# Patient Record
Sex: FEMALE | Race: BLACK OR AFRICAN AMERICAN | NOT HISPANIC OR LATINO | Employment: FULL TIME | URBAN - METROPOLITAN AREA
[De-identification: names, ages, dates, MRNs, and addresses within clinical notes are randomized per-mention and may not be internally consistent; named-entity substitution may affect disease eponyms.]

---

## 2024-04-02 LAB
EXTERNAL ABO GROUPING: NORMAL
EXTERNAL ANTIBODY SCREEN: NORMAL
EXTERNAL HEPATITIS B SURFACE ANTIGEN: NEGATIVE
EXTERNAL RH FACTOR: POSITIVE
HCV AB S/CO SERPL IA: NONREACTIVE
HEMOGLOBIN FRACTIONATION: NORMAL
HIV 1+2 AB+HIV1 P24 AG SERPL QL IA: NONREACTIVE
RUBV IGG SERPL IA-ACNC: NORMAL
T PALLIDUM IGG SER QL: NONREACTIVE

## 2024-05-22 ENCOUNTER — TELEPHONE (OUTPATIENT)
Dept: OBSTETRICS AND GYNECOLOGY | Age: 19
End: 2024-05-22

## 2024-05-22 NOTE — TELEPHONE ENCOUNTER
Caller: Maria L Carlisle    Relationship: Self    Best call back number:[phone]      Who are you requesting to speak with (clinical staff, NO KNOWN PROVIDER      What was the call regarding: PATIENT CALLED TO SCHEDULE AN APPT.  PATIENT ALSO ADVISED THAT SHE CAME INTO THE OFFICE AND SIGNED THE MEDICAL RECORDS RELEASE FORM, TO PREVIOUS MEDICAL RECORDS FROM DR. SHEN IN [place], [place]. PATIENT WOULD LIKE TO KNOW WHEN SHE CAN SCHEDULE APPT.  PLEASE UPDATE

## 2024-06-05 ENCOUNTER — INITIAL PRENATAL (OUTPATIENT)
Dept: OBSTETRICS AND GYNECOLOGY | Age: 19
End: 2024-06-05
Payer: MEDICAID

## 2024-06-05 VITALS — SYSTOLIC BLOOD PRESSURE: 120 MMHG | WEIGHT: 154 LBS | DIASTOLIC BLOOD PRESSURE: 82 MMHG

## 2024-06-05 DIAGNOSIS — Z3A.18 18 WEEKS GESTATION OF PREGNANCY: Primary | ICD-10-CM

## 2024-06-05 LAB
BILIRUB BLD-MCNC: NEGATIVE MG/DL
CLARITY, POC: CLEAR
COLOR UR: YELLOW
GLUCOSE UR STRIP-MCNC: NEGATIVE MG/DL
KETONES UR QL: NEGATIVE
LEUKOCYTE EST, POC: NEGATIVE
NITRITE UR-MCNC: NEGATIVE MG/ML
PH UR: 8.5 [PH] (ref 5–8)
PROT UR STRIP-MCNC: NEGATIVE MG/DL
RBC # UR STRIP: NEGATIVE /UL
SP GR UR: 1.01 (ref 1–1.03)
UROBILINOGEN UR QL: ABNORMAL

## 2024-06-05 RX ORDER — GUAIFENESIN 600 MG/1
1200 TABLET, EXTENDED RELEASE ORAL
COMMUNITY
Start: 2024-06-01 | End: 2024-06-09

## 2024-06-05 RX ORDER — PNV,CALCIUM 72/IRON/FOLIC ACID 27 MG-1 MG
1 TABLET ORAL DAILY
COMMUNITY
Start: 2024-03-05

## 2024-06-05 RX ORDER — DEXTROMETHORPHAN POLISTIREX 30 MG/5ML
60 SUSPENSION ORAL
COMMUNITY
Start: 2024-06-01

## 2024-06-05 RX ORDER — FLUTICASONE PROPIONATE AND SALMETEROL XINAFOATE 230; 21 UG/1; UG/1
2 AEROSOL, METERED RESPIRATORY (INHALATION)
COMMUNITY
Start: 2024-02-05

## 2024-06-05 NOTE — PROGRESS NOTES
[facility]   Obstetrics and Gynecology   New Obstetric Visit    2024    Patient: Maria L Carlisle          MR#:[id number]    History of Present Illness    Chief Complaint   Patient presents with    Initial Prenatal Visit     New OB, Transfer of care, Anatomy U/S today, Pt is 18w2d, Pt has no complaints today       18 y.o. female  at 18w2d presents for NOB visit.  She is doing well today.  Taking prenatal vitamins.    Studies reviewed: Reviewed records from [facility]     ________________________________________  Patient Active Problem List   Diagnosis    18 weeks gestation of pregnancy     OB History          1    Para        Term                AB        Living             SAB        IAB        Ectopic        Molar        Multiple        Live Births                      Social History:  Denies h/o gonorrhea, chlamydia, herpes, syphilis, HIV  Denies family history of birth defects and genetic disorders    No past medical history on file.  No past surgical history on file.  Social History     Tobacco Use   Smoking Status Not on file   Smokeless Tobacco Not on file     No family history on file.  Prior to Admission medications    Medication Sig Start Date End Date Taking? Authorizing Provider   dextromethorphan polistirex ER (DELSYM) 30 MG/5ML Suspension Extended Release oral suspension Take 10 mL by mouth. 24  Yes Provider, MD Samia   fluticasone-salmeterol (Advair HFA) 230-21 MCG/ACT inhaler Inhale 2 puffs. 24  Yes Provider, MD Samia   guaiFENesin (MUCINEX) 600 MG 12 hr tablet Take 2 tablets by mouth. 24 Yes Provider, MD Samia   Prenatal Vit-Fe Fumarate-FA (WesTab Plus) 27-1 MG tablet Take 1 tablet by mouth Daily. 3/5/24  Yes ProviderSamia MD     ________________________________________    The following portions of the patient's history were reviewed and updated as appropriate: allergies, current medications, past family history, past medical  history, past social history, past surgical history, and problem list.    Review of Systems   All other systems reviewed and are negative.           Objective     /82   Wt 69.9 kg (154 lb)   LMP 2024    BP Readings from Last 3 Encounters:   24 120/82      Wt Readings from Last 3 Encounters:   24 69.9 kg (154 lb)        BMI: There is no height or weight on file to calculate BMI.    Physical Exam  Vitals and nursing note reviewed.   Constitutional:       Appearance: Normal appearance.   HENT:      Head: Normocephalic and atraumatic.   Pulmonary:      Effort: Pulmonary effort is normal.   Neurological:      Mental Status: She is alert and oriented to person, place, and time.   Psychiatric:         Mood and Affect: Mood normal.           Assessment:  18 y.o. female  at 18w2d presents for NOB visit.  Diagnoses and all orders for this visit:    1. 18 weeks gestation of pregnancy (Primary)  Overview:  -PNL: plan for GBS at 36wga  -Pap: not yet indicated  -Genetics: cfDNA/msAFP/carrier > discuss with patient about whether or not she had this performed at [place], anatomy Us normal but incomplete on 24  -Imm: RI, VI, tdap after 28 weeks   -Contraception: _  -Birthplan: _  -Care coordination: accepts blood transfusions, no latex allergy, call schedule reviewed         Orders:  -     Urine Culture - Urine, Urine, Clean Catch  -     Chlamydia trachomatis, Neisseria gonorrhoeae, Trichomonas vaginalis, PCR - Urine, Urine, Clean Catch  -     POC Urinalysis Dipstick          Plan:  Return in about 4 weeks (around 7/3/2024).      Nan Wheat MD  2024 11:20 EDT

## 2024-06-07 LAB
BACTERIA UR CULT: NORMAL
BACTERIA UR CULT: NORMAL
C TRACH RRNA SPEC QL NAA+PROBE: NEGATIVE
N GONORRHOEA RRNA SPEC QL NAA+PROBE: NEGATIVE
T VAGINALIS RRNA SPEC QL NAA+PROBE: NEGATIVE

## 2024-06-12 ENCOUNTER — REFERRAL TRIAGE (OUTPATIENT)
Dept: LABOR AND DELIVERY | Facility: HOSPITAL | Age: 19
End: 2024-06-12
Payer: MEDICAID

## 2024-06-19 ENCOUNTER — PATIENT OUTREACH (OUTPATIENT)
Dept: LABOR AND DELIVERY | Facility: HOSPITAL | Age: 19
End: 2024-06-19
Payer: COMMERCIAL

## 2024-06-19 NOTE — OUTREACH NOTE
[facility]  Enrollment    Current Estimated Gestational Age: 20w2d    Questions/Answers      Flowsheet Row Responses   Would like to participate? Yes   Date of Intake Visit 06/19/24            [facility]  Intake    Current Estimated Gestational Age: 20w2d    Intake Assessment      Flowsheet Row Responses   Best Method for Contacting Cell   Currently Employed Yes  []   Able to keep appointments as scheduled Yes   Gender(s) and Name(s) boy   Do you have a dentist? Yes   Dentist Name in [place] but needs to find one here   Resources Presently Utilizing: None   Maternal Warning Signs Provided   Other Education HANDS, [place] Benefits, How to find a pediatrician, Insurance benefits/Incentives            Learning Assessment      Flowsheet Row Responses   Relationship Patient   Learner Name Maria L   Does the learner have any barriers to learning? No Barriers   What is the preferred language of the learner for medical teaching? English   Is an  required? No   How does the learner prefer to learn new concepts? Listening, Reading, Demonstration, Pictures/Video          Intake completed today. She works full time at a  and lives with her grandmother. She is working on getting a car and in the meantime her family is able to help with transportation. Reviewed WIC and sent her the number. She has already started the process of ordering a breast pump and has a baby shower coming up in a few months. F/u in two months.     Isabell Meyer RN  Maternity Nurse Navigator    6/19/2024, 13:24 EDT

## 2024-06-27 ENCOUNTER — HOSPITAL ENCOUNTER (EMERGENCY)
Facility: HOSPITAL | Age: 19
Discharge: HOME | End: 2024-06-27
Attending: STUDENT IN AN ORGANIZED HEALTH CARE EDUCATION/TRAINING PROGRAM | Admitting: OBSTETRICS & GYNECOLOGY
Payer: COMMERCIAL

## 2024-06-27 VITALS
HEART RATE: 85 BPM | BODY MASS INDEX: 30.21 KG/M2 | WEIGHT: 160 LBS | SYSTOLIC BLOOD PRESSURE: 116 MMHG | RESPIRATION RATE: 18 BRPM | HEIGHT: 61 IN | DIASTOLIC BLOOD PRESSURE: 62 MMHG | TEMPERATURE: 98.3 F

## 2024-06-27 PROCEDURE — 99284 EMERGENCY DEPT VISIT MOD MDM: CPT | Performed by: OBSTETRICS & GYNECOLOGY
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
## 2024-06-27 NOTE — NURSING NOTE
Pt given discharge teachings and when to return to hospital. Pt verbalized understanding. Pt ambulated off unit with belongings.

## 2024-07-03 ENCOUNTER — LAB (OUTPATIENT)
Facility: HOSPITAL | Age: 19
End: 2024-07-03
Payer: COMMERCIAL

## 2024-07-03 ENCOUNTER — ROUTINE PRENATAL (OUTPATIENT)
Dept: OBSTETRICS AND GYNECOLOGY | Age: 19
End: 2024-07-03
Payer: COMMERCIAL

## 2024-07-03 VITALS — WEIGHT: 156 LBS | DIASTOLIC BLOOD PRESSURE: 68 MMHG | BODY MASS INDEX: 29.48 KG/M2 | SYSTOLIC BLOOD PRESSURE: 112 MMHG

## 2024-07-03 DIAGNOSIS — O28.3 ECHOGENIC INTRACARDIAC FOCUS OF FETUS ON PRENATAL ULTRASOUND: ICD-10-CM

## 2024-07-03 DIAGNOSIS — Z3A.22 22 WEEKS GESTATION OF PREGNANCY: Primary | ICD-10-CM

## 2024-07-03 LAB
CLARITY, POC: CLEAR
COLOR UR: YELLOW
GLUCOSE UR STRIP-MCNC: NEGATIVE MG/DL
PROT UR STRIP-MCNC: NEGATIVE MG/DL

## 2024-07-03 RX ORDER — BUDESONIDE AND FORMOTEROL FUMARATE DIHYDRATE 160; 4.5 UG/1; UG/1
2 AEROSOL RESPIRATORY (INHALATION)
COMMUNITY
Start: 2024-06-13

## 2024-07-03 NOTE — PROGRESS NOTES
Maria L Carlisle, a 18 y.o.  at 22w2d, presents for OB follow-up.  She is doing well today.  Denies loss of fluid, vaginal bleeding, and contractions.  Endorses fetal movements.    Objective  BP Readings from Last 3 Encounters:   24 112/68   24 116/62   24 120/82      Wt Readings from Last 3 Encounters:   24 70.8 kg (156 lb)   24 72.6 kg (160 lb)   24 69.9 kg (154 lb)      Total weight gain this pregnancy: 4.536 kg (10 lb)    General: Awake, alert, no apparent distress  Respiratory: No increased work of breathing  Abdomen: Fundus soft and nontender  Extremity: Nontender, no edema    A/P  Diagnoses and all orders for this visit:    1. 22 weeks gestation of pregnancy (Primary)  Overview:  -PNL: ION labs reviewed, O+, no GC/CT or urine culture in scanned labs, will collect 24, plan for GBS at 36wga  -Pap: not yet indicated  -Genetics: cfDNA/msAFP/carrier > discuss with patient about whether or not she had this performed at [place], anatomy Us normal but incomplete on 24  -Imm: RI, VI, tdap after 28 weeks   -Contraception: _  -Birthplan: _  -Care coordination: accepts blood transfusions, no latex allergy, call schedule reviewed         Orders:  -     POC Urinalysis Dipstick  -     [facility] Panorama Prenatal Test: Chromosomes 13, 18, 21, X & Y: Triploidy 22Q.11.2 Deletion - Blood,    2. Echogenic intracardiac focus of fetus on prenatal ultrasound  Overview:  - Has not yet had genetic testing.   - We discussed that as an isolated finding with normal genetic testing this can be a normal variant, but can also be a marker of aneuploidy.         EFW 11%tile today. Will repeat growth with next visit.   1 hour next visit.   Labor precautions reviewed  Return in about 4 weeks (around 2024).    Nan Wheat MD

## 2024-08-01 ENCOUNTER — ROUTINE PRENATAL (OUTPATIENT)
Dept: OBSTETRICS AND GYNECOLOGY | Age: 19
End: 2024-08-01
Payer: COMMERCIAL

## 2024-08-01 VITALS — SYSTOLIC BLOOD PRESSURE: 126 MMHG | DIASTOLIC BLOOD PRESSURE: 80 MMHG | WEIGHT: 164 LBS | BODY MASS INDEX: 30.99 KG/M2

## 2024-08-01 DIAGNOSIS — O28.3 ECHOGENIC INTRACARDIAC FOCUS OF FETUS ON PRENATAL ULTRASOUND: ICD-10-CM

## 2024-08-01 DIAGNOSIS — Z23 NEED FOR TDAP VACCINATION: ICD-10-CM

## 2024-08-01 DIAGNOSIS — Z3A.26 26 WEEKS GESTATION OF PREGNANCY: Primary | ICD-10-CM

## 2024-08-01 NOTE — PROGRESS NOTES
Maria L Carlisle, a 18 y.o.  at 26w3d, presents for OB follow-up.  She is doing well today.  Denies loss of fluid, vaginal bleeding, and contractions.  Endorses fetal movements.    Objective  BP Readings from Last 3 Encounters:   24 126/80   24 112/68   24 116/62      Wt Readings from Last 3 Encounters:   24 74.4 kg (164 lb)   24 70.8 kg (156 lb)   24 72.6 kg (160 lb)      Total weight gain this pregnancy: 8.165 kg (18 lb)    General: Awake, alert, no apparent distress  Respiratory: No increased work of breathing  Abdomen: Fundus soft and nontender  Extremity: Nontender, no edema    A/P  Diagnoses and all orders for this visit:    1. 26 weeks gestation of pregnancy (Primary)  Overview:  -PNL: ION labs reviewed, O+, no GC/CT or urine culture in scanned labs, will collect 24, plan for GBS at 36wga  -Pap: not yet indicated  -Genetics: cfDNA/msAFP/carrier > low risk, anatomy Us normal-Imm: RI, VI, tdap after 28 weeks   -Contraception: _  -Birthplan: _  -Care coordination: accepts blood transfusions, no latex allergy, call schedule reviewed         Orders:  -     POC Urinalysis Dipstick  -     Gestational Screen 1 Hr ([facility])  -     CBC & Differential  -     RPR, Rfx Qn RPR / Confirm TP    2. Echogenic intracardiac focus of fetus on prenatal ultrasound  Overview:  - Has not yet had genetic testing.   - We discussed that as an isolated finding with normal genetic testing this can be a normal variant, but can also be a marker of aneuploidy.           Labor precautions reviewed  No follow-ups on file.    Nan Wheat MD

## 2024-08-02 LAB
BASOPHILS # BLD AUTO: 0 X10E3/UL (ref 0–0.2)
BASOPHILS NFR BLD AUTO: 0 %
EOSINOPHIL # BLD AUTO: 0.6 X10E3/UL (ref 0–0.4)
EOSINOPHIL NFR BLD AUTO: 5 %
ERYTHROCYTE [DISTWIDTH] IN BLOOD BY AUTOMATED COUNT: 12.8 % (ref 11.7–15.4)
GLUCOSE 1H P 50 G GLC PO SERPL-MCNC: 89 MG/DL (ref 70–139)
HCT VFR BLD AUTO: 36.6 % (ref 34–46.6)
HGB BLD-MCNC: 11.7 G/DL (ref 11.1–15.9)
IMM GRANULOCYTES # BLD AUTO: 0.1 X10E3/UL (ref 0–0.1)
IMM GRANULOCYTES NFR BLD AUTO: 1 %
LYMPHOCYTES # BLD AUTO: 2 X10E3/UL (ref 0.7–3.1)
LYMPHOCYTES NFR BLD AUTO: 15 %
MCH RBC QN AUTO: 29.8 PG (ref 26.6–33)
MCHC RBC AUTO-ENTMCNC: 32 G/DL (ref 31.5–35.7)
MCV RBC AUTO: 93 FL (ref 79–97)
MONOCYTES # BLD AUTO: 0.5 X10E3/UL (ref 0.1–0.9)
MONOCYTES NFR BLD AUTO: 4 %
NEUTROPHILS # BLD AUTO: 9.7 X10E3/UL (ref 1.4–7)
NEUTROPHILS NFR BLD AUTO: 75 %
PLATELET # BLD AUTO: 246 X10E3/UL (ref 150–450)
RBC # BLD AUTO: 3.92 X10E6/UL (ref 3.77–5.28)
RPR SER QL: NON REACTIVE
WBC # BLD AUTO: 12.8 X10E3/UL (ref 3.4–10.8)

## 2024-08-12 ENCOUNTER — PATIENT OUTREACH (OUTPATIENT)
Dept: LABOR AND DELIVERY | Facility: HOSPITAL | Age: 19
End: 2024-08-12
Payer: COMMERCIAL

## 2024-08-12 NOTE — OUTREACH NOTE
[facility]  Check-In    Current Estimated Gestational Age: 28w0d      Questions/Answers      Flowsheet Row Responses   Best Method for Contacting Cell   Demographics Reviewed Yes   Currently Employed Yes   Able to keep appointments as scheduled Yes   Gender(s) and Name(s) boy   Baby Active/Feeling Fetal Movemen Yes   How are you presently feeling? good   Supplies ready for baby Breast Pump, Clothing, Diapers   Resource/Environmental Concerns None   Do you have any questions related to your care experience, your pregnancy, plans for delivery, any concerns, etc? No   Other Education Insurance benefits/Incentives, [facility] Benefits            Pt doing well. She does report some light intermittent cramping and states that she has discussed this with her OB. She has had issues reaching [facility] for an appt but will keep trying. Still planning a baby shower for Sept and will call [facility] about bonus benefits. She reports active fetal movement. F/u around 35 weeks.     Isabell Meyer RN  Maternity Nurse Navigator    8/12/2024, 12:37 EDT

## 2024-08-14 ENCOUNTER — HOSPITAL ENCOUNTER (EMERGENCY)
Facility: HOSPITAL | Age: 19
Discharge: HOME | End: 2024-08-14
Attending: STUDENT IN AN ORGANIZED HEALTH CARE EDUCATION/TRAINING PROGRAM | Admitting: OBSTETRICS & GYNECOLOGY
Payer: COMMERCIAL

## 2024-08-14 ENCOUNTER — TELEPHONE (OUTPATIENT)
Dept: OBSTETRICS AND GYNECOLOGY | Age: 19
End: 2024-08-14
Payer: COMMERCIAL

## 2024-08-14 VITALS
HEART RATE: 86 BPM | RESPIRATION RATE: 18 BRPM | WEIGHT: 165.6 LBS | DIASTOLIC BLOOD PRESSURE: 71 MMHG | TEMPERATURE: 98.5 F | SYSTOLIC BLOOD PRESSURE: 130 MMHG | HEIGHT: 61 IN | BODY MASS INDEX: 31.26 KG/M2

## 2024-08-14 LAB
BACTERIA UR QL AUTO: ABNORMAL /HPF
BILIRUB UR QL STRIP: NEGATIVE
CLARITY UR: CLEAR
COLOR UR: YELLOW
GLUCOSE UR STRIP-MCNC: NEGATIVE MG/DL
HGB UR QL STRIP.AUTO: NEGATIVE
HYALINE CASTS UR QL AUTO: ABNORMAL /LPF
KETONES UR QL STRIP: NEGATIVE
LEUKOCYTE ESTERASE UR QL STRIP.AUTO: ABNORMAL
NITRITE UR QL STRIP: NEGATIVE
PH UR STRIP.AUTO: 7 [PH] (ref 5–8)
PROT UR QL STRIP: NEGATIVE
RBC # UR STRIP: ABNORMAL /HPF
REF LAB TEST METHOD: ABNORMAL
SP GR UR STRIP: 1.01 (ref 1–1.03)
SQUAMOUS #/AREA URNS HPF: ABNORMAL /HPF
UROBILINOGEN UR QL STRIP: ABNORMAL
WBC # UR STRIP: ABNORMAL /HPF

## 2024-08-14 PROCEDURE — 59025 FETAL NON-STRESS TEST: CPT

## 2024-08-14 PROCEDURE — 87086 URINE CULTURE/COLONY COUNT: CPT | Performed by: OBSTETRICS & GYNECOLOGY

## 2024-08-14 PROCEDURE — 81001 URINALYSIS AUTO W/SCOPE: CPT | Performed by: OBSTETRICS & GYNECOLOGY

## 2024-08-14 PROCEDURE — 99284 EMERGENCY DEPT VISIT MOD MDM: CPT | Performed by: OBSTETRICS & GYNECOLOGY

## 2024-08-14 RX ORDER — ALBUTEROL SULFATE 90 UG/1
4 AEROSOL, METERED RESPIRATORY (INHALATION)
COMMUNITY
Start: 2024-06-09

## 2024-08-14 RX ORDER — INHALER, ASSIST DEVICES
SPACER (EA) MISCELLANEOUS SEE ADMIN INSTRUCTIONS
COMMUNITY
Start: 2024-06-09

## 2024-08-15 ENCOUNTER — ROUTINE PRENATAL (OUTPATIENT)
Dept: OBSTETRICS AND GYNECOLOGY | Age: 19
End: 2024-08-15
Payer: COMMERCIAL

## 2024-08-15 VITALS — BODY MASS INDEX: 30.8 KG/M2 | WEIGHT: 163 LBS | SYSTOLIC BLOOD PRESSURE: 110 MMHG | DIASTOLIC BLOOD PRESSURE: 74 MMHG

## 2024-08-15 DIAGNOSIS — Z3A.28 28 WEEKS GESTATION OF PREGNANCY: ICD-10-CM

## 2024-08-15 DIAGNOSIS — O28.3 ECHOGENIC INTRACARDIAC FOCUS OF FETUS ON PRENATAL ULTRASOUND: Primary | ICD-10-CM

## 2024-08-15 NOTE — TELEPHONE ENCOUNTER
Called pt back after she paged MD on call. She complains of decreased fetal movement today. Advised pt to proceed to L&D for evaluation.

## 2024-08-15 NOTE — OBED NOTES
HERI Note OB        Patient Name: Maria L Carlisle  YOB: 2005  MRN: [id number]  Admission Date: 2024  9:27 PM  Date of Service: 2024    Chief Complaint: Decreased Fetal Movement (To HERI with c/o only feeling baby move 1x/today)        Subjective     Maria L Carlisle is a 18 y.o. female  at 28w2d with Estimated Date of Delivery: 24 who presents with the chief complaint listed above.  She sees Nan Wheat, * for her prenatal care. Her pregnancy has been complicated by:  echogenic intracardiac focus.    She describes fetal movement as decreased.  She denies rupture of membranes.  She denies vaginal bleeding. She is not feeling contractions.          Objective   Patient Active Problem List    Diagnosis     Echogenic intracardiac focus of fetus on prenatal ultrasound [O28.3]     26 weeks gestation of pregnancy [Z3A.26]         OB History    Para Term  AB Living   1 0 0 0 0 0   SAB IAB Ectopic Molar Multiple Live Births   0 0 0 0 0 0      # Outcome Date GA Lbr Ethan/2nd Weight Sex Type Anes PTL Lv   1 Current                 Past Medical History:   Diagnosis Date    Asthma        History reviewed. No pertinent surgical history.    No current facility-administered medications on file prior to encounter.     Current Outpatient Medications on File Prior to Encounter   Medication Sig Dispense Refill    albuterol sulfate  (90 Base) MCG/ACT inhaler Inhale 4 puffs.      Spacer/Aero-Holding Chambers (ProChamber VHC) device Inhale See Admin Instructions. with inhaler      budesonide-formoterol (SYMBICORT) 160-4.5 MCG/ACT inhaler Inhale 2 puffs.      dextromethorphan polistirex ER (DELSYM) 30 MG/5ML Suspension Extended Release oral suspension Take 10 mL by mouth. (Patient not taking: Reported on 7/3/2024)      fluticasone-salmeterol (Advair HFA) 230-21 MCG/ACT inhaler Inhale 2 puffs.      Prenatal Vit-Fe Fumarate-FA (WesTab Plus) 27-1 MG tablet Take 1 tablet by mouth  "Daily.         Allergies   Allergen Reactions    Pumpkin Seed Oil Unknown - Low Severity     All pumpkin    Peanut-Containing Drug Products Rash       Family History   Problem Relation Age of Onset    Other (peripartum cardiomyopathy) Mother        Social History     Socioeconomic History    Marital status: Single   Tobacco Use    Smoking status: Never    Smokeless tobacco: Never   Substance and Sexual Activity    Alcohol use: Never    Drug use: Never           Review of Systems   Constitutional: Negative.    HENT: Negative.     Respiratory: Negative.     Cardiovascular: Negative.    Gastrointestinal: Negative.    Genitourinary: Negative.    Neurological: Negative.           PHYSICAL EXAM:      VITAL SIGNS:  Vitals:    08/14/24 2100 08/14/24 2142   BP:  130/71   BP Location:  Right arm   Patient Position:  Sitting   Pulse:  86   Resp:  18   Temp:  98.5 °F (36.9 °C)   TempSrc:  Oral   Weight: 75.1 kg (165 lb 9.6 oz)    Height:  154.9 cm (61\")        FHT:                   Baseline:  150 BPM  Variability:  Moderate = 6 - 25 BPM  Accelerations:  10 x 10 accelerations present     Decelerations: few shallow variables initially  Contractions:   absent     Interpretation:    Reactive NST for gesational age, CAT 1 tracing        PHYSICAL EXAM:    General: well developed; well nourished  no acute distress   Heart: Not performed.   Lungs  : breathing is unlabored     Abdomen: soft, non-tender; no masses       Cervix: was not checked.      Contractions: none        Extremities: peripheral pulses normal, no pedal edema, no clubbing or cyanosis      LABS AND TESTING ORDERED:  Uterine and fetal monitoring  Urinalysis      LAB RESULTS:    Recent Results (from the past 24 hour(s))   Urinalysis With Culture If Indicated - Urine, Clean Catch    Collection Time: 08/14/24  9:46 PM    Specimen: Urine, Clean Catch   Result Value Ref Range    Color, UA Yellow Yellow, Straw    Appearance, UA Clear Clear    pH, UA 7.0 5.0 - 8.0    Specific " "[place], UA 1.012 1.005 - 1.030    Glucose, UA Negative Negative    Ketones, UA Negative Negative    Bilirubin, UA Negative Negative    Blood, UA Negative Negative    Protein, UA Negative Negative    Leuk Esterase, UA Large (3+) (A) Negative    Nitrite, UA Negative Negative    Urobilinogen, UA 0.2 E.U./dL 0.2 - 1.0 E.U./dL   Urinalysis, Microscopic Only - Urine, Clean Catch    Collection Time: 24  9:46 PM    Specimen: Urine, Clean Catch   Result Value Ref Range    RBC, UA 0-2 None Seen, 0-2 /HPF    WBC, UA 6-10 (A) None Seen, 0-2 /HPF    Bacteria, UA None Seen None Seen /HPF    Squamous Epithelial Cells, UA 3-6 (A) None Seen, 0-2 /HPF    Hyaline Casts, UA None Seen None Seen /LPF    Methodology Automated Microscopy        Lab Results   Component Value Date    ABO O 2024    RH Positive 2024       No results found for: \"STREPGPB\"              Assessment & Plan     ASSESSMENT/PLAN:  Maria L Carlisle is a 18 y.o. female  at 28w2d who presented with: decreased fetal movement      PLAN:     Patient began to feel fetal movement while in HERI  She was discharged home with labor precautions.  Patient will keep her scheduled outpatient prenatal appointment.    I have spent 30 minutes including face to face time with the patient, greater than 50% in discussion of the diagnosis (counseling) and/or coordination of care.     Katie Colby MD  2024  22:32 EDT  OB Hospitalist  Phone:  [phone]  "

## 2024-08-15 NOTE — PROGRESS NOTES
Maria L Carlisle, a 18 y.o.  at 28w3d, presents for OB follow-up.  She is doing well today.  Denies loss of fluid, vaginal bleeding, and contractions.  Endorses fetal movements.    Objective  BP Readings from Last 3 Encounters:   08/15/24 110/74   24 130/71   24 126/80      Wt Readings from Last 3 Encounters:   08/15/24 73.9 kg (163 lb)   24 75.1 kg (165 lb 9.6 oz)   24 74.4 kg (164 lb)      Total weight gain this pregnancy: 7.711 kg (17 lb)    General: Awake, alert, no apparent distress  Respiratory: No increased work of breathing  Abdomen: Fundus soft and nontender  Extremity: Nontender, no edema    A/P  Diagnoses and all orders for this visit:    1. Echogenic intracardiac focus of fetus on prenatal ultrasound (Primary)  Overview:  - Has not yet had genetic testing.   - We discussed that as an isolated finding with normal genetic testing this can be a normal variant, but can also be a marker of aneuploidy.       2. 28 weeks gestation of pregnancy  Overview:  -PNL: ION labs reviewed, O+, no GC/CT or urine culture in scanned labs, will collect 24, plan for GBS at 36wga  -Pap: not yet indicated  -Genetics: cfDNA/msAFP/carrier > low risk, anatomy Us normal-Imm: RI, VI, tdap after 28 weeks   -Contraception: _  -Birthplan: _  -Care coordination: accepts blood transfusions, no latex allergy, call schedule reviewed         Orders:  -     POC Urinalysis Dipstick        Labor precautions reviewed  Return in about 2 weeks (around 2024).    Nan Wheat MD

## 2024-08-16 LAB — BACTERIA SPEC AEROBE CULT: NO GROWTH

## 2024-08-29 ENCOUNTER — ROUTINE PRENATAL (OUTPATIENT)
Dept: OBSTETRICS AND GYNECOLOGY | Age: 19
End: 2024-08-29
Payer: COMMERCIAL

## 2024-08-29 VITALS — WEIGHT: 167 LBS | DIASTOLIC BLOOD PRESSURE: 72 MMHG | SYSTOLIC BLOOD PRESSURE: 120 MMHG | BODY MASS INDEX: 31.55 KG/M2

## 2024-08-29 DIAGNOSIS — Z3A.30 30 WEEKS GESTATION OF PREGNANCY: ICD-10-CM

## 2024-08-29 DIAGNOSIS — O99.019 MATERNAL ANEMIA IN PREGNANCY, ANTEPARTUM: ICD-10-CM

## 2024-08-29 DIAGNOSIS — O28.3 ECHOGENIC INTRACARDIAC FOCUS OF FETUS ON PRENATAL ULTRASOUND: ICD-10-CM

## 2024-08-29 DIAGNOSIS — Z34.93 PRENATAL CARE IN THIRD TRIMESTER: Primary | ICD-10-CM

## 2024-08-29 LAB
GLUCOSE UR STRIP-MCNC: NEGATIVE MG/DL
PROT UR STRIP-MCNC: NEGATIVE MG/DL

## 2024-08-29 RX ORDER — FERROUS SULFATE 325(65) MG
325 TABLET ORAL
Qty: 30 TABLET | Refills: 4 | Status: SHIPPED | OUTPATIENT
Start: 2024-08-29

## 2024-08-29 NOTE — PROGRESS NOTES
Chief Complaint   Patient presents with    Routine Prenatal Visit     30 weeks  CC:  Pt was in hospital on 2024, dx pneumonia       HPI: 18 y.o.  at 30w3d     Doing well  Was in hospital with pneumonia last week  She is doing much better  Reports good FM  Denies LOF, bleeding or ctx's  Pt of Dr Wheat    Vitals:    24 1134   BP: 120/72   Weight: 75.8 kg (167 lb)       ROS:  GI:  Negative  : Negative  Pulmonary: Negative     A/P  1. Intrauterine pregnancy at 30w3d   2. Pregnancy Risk:  NORMAL    Diagnoses and all orders for this visit:    1. Prenatal care in third trimester (Primary)    2. 30 weeks gestation of pregnancy  -     POC Urinalysis Dipstick    3. Echogenic intracardiac focus of fetus on prenatal ultrasound    4. Maternal anemia in pregnancy, antepartum    Other orders  -     ferrous sulfate 325 (65 FE) MG tablet; Take 1 tablet by mouth Daily With Breakfast.  Dispense: 30 tablet; Refill: 4        -----------------------  PLAN:   Anemia - start iron daily  PTL/FKC discussed  Return for Next scheduled follow up.      Ivon Caicedo, APRN  2024 11:49 EDT

## 2024-09-12 ENCOUNTER — ROUTINE PRENATAL (OUTPATIENT)
Dept: OBSTETRICS AND GYNECOLOGY | Age: 19
End: 2024-09-12
Payer: COMMERCIAL

## 2024-09-12 ENCOUNTER — HOSPITAL ENCOUNTER (OUTPATIENT)
Dept: CARDIOLOGY | Facility: HOSPITAL | Age: 19
Discharge: HOME | End: 2024-09-12
Admitting: STUDENT IN AN ORGANIZED HEALTH CARE EDUCATION/TRAINING PROGRAM
Payer: COMMERCIAL

## 2024-09-12 VITALS — WEIGHT: 170 LBS | DIASTOLIC BLOOD PRESSURE: 78 MMHG | BODY MASS INDEX: 32.12 KG/M2 | SYSTOLIC BLOOD PRESSURE: 120 MMHG

## 2024-09-12 DIAGNOSIS — O99.019 MATERNAL ANEMIA IN PREGNANCY, ANTEPARTUM: ICD-10-CM

## 2024-09-12 DIAGNOSIS — Z3A.32 32 WEEKS GESTATION OF PREGNANCY: Primary | ICD-10-CM

## 2024-09-12 DIAGNOSIS — R60.0 LEG EDEMA, RIGHT: ICD-10-CM

## 2024-09-12 LAB
BH CV LOWER VASCULAR LEFT COMMON FEMORAL AUGMENT: NORMAL
BH CV LOWER VASCULAR LEFT COMMON FEMORAL COMPETENT: NORMAL
BH CV LOWER VASCULAR LEFT COMMON FEMORAL COMPRESS: NORMAL
BH CV LOWER VASCULAR LEFT COMMON FEMORAL PHASIC: NORMAL
BH CV LOWER VASCULAR LEFT COMMON FEMORAL SPONT: NORMAL
BH CV LOWER VASCULAR LEFT DISTAL FEMORAL COMPRESS: NORMAL
BH CV LOWER VASCULAR LEFT GASTRONEMIUS COMPRESS: NORMAL
BH CV LOWER VASCULAR LEFT GREATER SAPH AK COMPRESS: NORMAL
BH CV LOWER VASCULAR LEFT GREATER SAPH BK COMPRESS: NORMAL
BH CV LOWER VASCULAR LEFT LESSER SAPH COMPRESS: NORMAL
BH CV LOWER VASCULAR LEFT MID FEMORAL AUGMENT: NORMAL
BH CV LOWER VASCULAR LEFT MID FEMORAL COMPETENT: NORMAL
BH CV LOWER VASCULAR LEFT MID FEMORAL COMPRESS: NORMAL
BH CV LOWER VASCULAR LEFT MID FEMORAL PHASIC: NORMAL
BH CV LOWER VASCULAR LEFT MID FEMORAL SPONT: NORMAL
BH CV LOWER VASCULAR LEFT PERONEAL COMPRESS: NORMAL
BH CV LOWER VASCULAR LEFT POPLITEAL AUGMENT: NORMAL
BH CV LOWER VASCULAR LEFT POPLITEAL COMPETENT: NORMAL
BH CV LOWER VASCULAR LEFT POPLITEAL COMPRESS: NORMAL
BH CV LOWER VASCULAR LEFT POPLITEAL PHASIC: NORMAL
BH CV LOWER VASCULAR LEFT POPLITEAL SPONT: NORMAL
BH CV LOWER VASCULAR LEFT POSTERIOR TIBIAL COMPRESS: NORMAL
BH CV LOWER VASCULAR LEFT PROFUNDA FEMORAL COMPRESS: NORMAL
BH CV LOWER VASCULAR LEFT PROXIMAL FEMORAL COMPRESS: NORMAL
BH CV LOWER VASCULAR LEFT SAPHENOFEMORAL JUNCTION COMPRESS: NORMAL
BH CV LOWER VASCULAR RIGHT COMMON FEMORAL AUGMENT: NORMAL
BH CV LOWER VASCULAR RIGHT COMMON FEMORAL COMPETENT: NORMAL
BH CV LOWER VASCULAR RIGHT COMMON FEMORAL COMPRESS: NORMAL
BH CV LOWER VASCULAR RIGHT COMMON FEMORAL PHASIC: NORMAL
BH CV LOWER VASCULAR RIGHT COMMON FEMORAL SPONT: NORMAL

## 2024-09-12 PROCEDURE — 93971 EXTREMITY STUDY: CPT

## 2024-09-12 NOTE — PROGRESS NOTES
Maria L Carlisle, a 18 y.o.  at 32w3d, presents for OB follow-up.  She is doing well today.  Denies loss of fluid, vaginal bleeding, and contractions.  Endorses fetal movements.    Objective  BP Readings from Last 3 Encounters:   24 120/78   24 120/72   08/15/24 110/74      Wt Readings from Last 3 Encounters:   24 77.1 kg (170 lb)   24 75.8 kg (167 lb)   08/15/24 73.9 kg (163 lb)      Total weight gain this pregnancy: 10.9 kg (24 lb)    General: Awake, alert, no apparent distress  Respiratory: No increased work of breathing  Abdomen: Fundus soft and nontender  Extremity: Nontender, no edema    A/P  Diagnoses and all orders for this visit:    1. 32 weeks gestation of pregnancy (Primary)  Overview:  -PNL: ION labs reviewed, O+, no GC/CT or urine culture in scanned labs, will collect 24, plan for GBS at 36wga  -Pap: not yet indicated  -Genetics: cfDNA/msAFP/carrier > low risk, anatomy Us normal-Imm: RI, VI, tdap after 28 weeks   -Contraception: _  -Birthplan: _  -Care coordination: accepts blood transfusions, no latex allergy, call schedule reviewed         Orders:  -     POC Urinalysis Dipstick    2. Maternal anemia in pregnancy, antepartum    3. Leg edema, right  -     Duplex Venous Lower Extremity - Bilateral CAR      EIF > f/u US with next visit.   Lower extremity edema r>L, patient to go to hospital for LE dopplers.   Labor precautions reviewed  Return in about 2 weeks (around 2024).    Nan Wheat MD

## 2024-09-26 ENCOUNTER — ROUTINE PRENATAL (OUTPATIENT)
Dept: OBSTETRICS AND GYNECOLOGY | Age: 19
End: 2024-09-26
Payer: COMMERCIAL

## 2024-09-26 ENCOUNTER — PATIENT OUTREACH (OUTPATIENT)
Dept: LABOR AND DELIVERY | Facility: HOSPITAL | Age: 19
End: 2024-09-26
Payer: COMMERCIAL

## 2024-09-26 VITALS — WEIGHT: 169 LBS | SYSTOLIC BLOOD PRESSURE: 130 MMHG | DIASTOLIC BLOOD PRESSURE: 78 MMHG | BODY MASS INDEX: 31.93 KG/M2

## 2024-09-26 DIAGNOSIS — O21.9 NAUSEA AND VOMITING IN PREGNANCY: ICD-10-CM

## 2024-09-26 DIAGNOSIS — O99.019 MATERNAL ANEMIA IN PREGNANCY, ANTEPARTUM: ICD-10-CM

## 2024-09-26 DIAGNOSIS — J45.41 MODERATE PERSISTENT ASTHMA WITH EXACERBATION: ICD-10-CM

## 2024-09-26 DIAGNOSIS — O28.3 ECHOGENIC INTRACARDIAC FOCUS OF FETUS ON PRENATAL ULTRASOUND: ICD-10-CM

## 2024-09-26 DIAGNOSIS — Z3A.34 34 WEEKS GESTATION OF PREGNANCY: Primary | ICD-10-CM

## 2024-09-26 LAB
CLARITY, POC: CLEAR
COLOR UR: YELLOW
GLUCOSE UR STRIP-MCNC: NEGATIVE MG/DL
PROT UR STRIP-MCNC: ABNORMAL MG/DL

## 2024-09-26 RX ORDER — ONDANSETRON 4 MG/1
4 TABLET, ORALLY DISINTEGRATING ORAL EVERY 8 HOURS PRN
Qty: 30 TABLET | Refills: 0 | Status: SHIPPED | OUTPATIENT
Start: 2024-09-26

## 2024-09-26 RX ORDER — BUDESONIDE 0.5 MG/2ML
0.5 INHALANT ORAL
COMMUNITY
Start: 2024-09-19 | End: 2025-09-19

## 2024-09-26 RX ORDER — PREDNISONE 10 MG/1
TABLET ORAL
COMMUNITY
Start: 2024-09-16

## 2024-10-01 ENCOUNTER — TRANSCRIBE ORDERS (OUTPATIENT)
Dept: ULTRASOUND IMAGING | Facility: HOSPITAL | Age: 19
End: 2024-10-01
Payer: COMMERCIAL

## 2024-10-01 DIAGNOSIS — J45.41 MODERATE PERSISTENT ASTHMA WITH EXACERBATION: Primary | ICD-10-CM

## 2024-10-03 ENCOUNTER — OFFICE VISIT (OUTPATIENT)
Dept: OBSTETRICS AND GYNECOLOGY | Facility: CLINIC | Age: 19
End: 2024-10-03
Payer: COMMERCIAL

## 2024-10-03 ENCOUNTER — LAB (OUTPATIENT)
Dept: LAB | Facility: HOSPITAL | Age: 19
End: 2024-10-03
Payer: COMMERCIAL

## 2024-10-03 ENCOUNTER — TRANSCRIBE ORDERS (OUTPATIENT)
Dept: ULTRASOUND IMAGING | Facility: HOSPITAL | Age: 19
End: 2024-10-03
Payer: COMMERCIAL

## 2024-10-03 ENCOUNTER — HOSPITAL ENCOUNTER (OUTPATIENT)
Dept: ULTRASOUND IMAGING | Facility: HOSPITAL | Age: 19
Discharge: HOME | End: 2024-10-03
Payer: COMMERCIAL

## 2024-10-03 VITALS
WEIGHT: 170.8 LBS | BODY MASS INDEX: 32.27 KG/M2 | OXYGEN SATURATION: 96 % | HEART RATE: 92 BPM | SYSTOLIC BLOOD PRESSURE: 128 MMHG | TEMPERATURE: 98 F | DIASTOLIC BLOOD PRESSURE: 77 MMHG

## 2024-10-03 DIAGNOSIS — J45.41 MODERATE PERSISTENT ASTHMA WITH EXACERBATION: ICD-10-CM

## 2024-10-03 DIAGNOSIS — J45.909 ASTHMA COMPLICATING PREGNANCY, ANTEPARTUM: Primary | ICD-10-CM

## 2024-10-03 DIAGNOSIS — O99.519 ASTHMA COMPLICATING PREGNANCY, ANTEPARTUM: Primary | ICD-10-CM

## 2024-10-03 DIAGNOSIS — O36.5990 FETAL GROWTH RESTRICTION ANTEPARTUM: ICD-10-CM

## 2024-10-03 DIAGNOSIS — J45.41 MODERATE PERSISTENT ASTHMA WITH EXACERBATION: Primary | ICD-10-CM

## 2024-10-03 PROCEDURE — 36415 COLL VENOUS BLD VENIPUNCTURE: CPT | Performed by: NURSE PRACTITIONER

## 2024-10-03 PROCEDURE — 76820 UMBILICAL ARTERY ECHO: CPT

## 2024-10-03 PROCEDURE — 86777 TOXOPLASMA ANTIBODY: CPT | Performed by: NURSE PRACTITIONER

## 2024-10-03 PROCEDURE — 86644 CMV ANTIBODY: CPT | Performed by: NURSE PRACTITIONER

## 2024-10-03 PROCEDURE — 86778 TOXOPLASMA ANTIBODY IGM: CPT | Performed by: NURSE PRACTITIONER

## 2024-10-03 PROCEDURE — 76811 OB US DETAILED SNGL FETUS: CPT

## 2024-10-03 PROCEDURE — 76819 FETAL BIOPHYS PROFIL W/O NST: CPT

## 2024-10-03 PROCEDURE — 86645 CMV ANTIBODY IGM: CPT | Performed by: NURSE PRACTITIONER

## 2024-10-03 PROCEDURE — 86747 PARVOVIRUS ANTIBODY: CPT | Performed by: NURSE PRACTITIONER

## 2024-10-03 NOTE — PROGRESS NOTES
Pt reports that she is doing well and denies vaginal bleeding or LOF at this time. Notes occasional abdominal tightness and cramping, denies consistency. Reports active fetal movement. Reviewed when to call OB office or present to L&D for evaluation with symptoms such as decreased fetal movement, vaginal bleeding, LOF or ctxs. Pt verbalized understanding. Denies HA, visual changes or epigastric pain. Denies any additional complaints at time of appointment. Next OB appointment scheduled for 10/10. Follows with [facility]. Pulse ox placed with readings of 95-98% noted today. Patient reports she has a pulse ox at home for further evaluation.     Vitals:    10/03/24 0908   BP: 128/77   Pulse: 92   Temp: 98 °F (36.7 °C)   SpO2: 96%

## 2024-10-03 NOTE — PROGRESS NOTES
MATERNAL FETAL MEDICINE CONSULT NOTE    Dear Dr Nan Wheat, *:    Thank you for your kind referral of Maria L Carlisle.  As you know, she is a 18 y.o.   35w3d gestation (Estimated Date of Delivery: 24). This is a consult.     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 Reports normal fetal movement.   She denies headache, vision changes, shortness of breath, acute changes in edema, and RUQ pain.     Review of History:  Past Medical History:   Diagnosis Date    Asthma      History reviewed. No pertinent surgical history.    OB History    Para Term  AB Living   1 0 0 0 0 0   SAB IAB Ectopic Molar Multiple Live Births   0 0 0 0 0 0      # Outcome Date GA Lbr Ethan/2nd Weight Sex Type Anes PTL Lv   1 Current              Social History     Socioeconomic History    Marital status: Single   Tobacco Use    Smoking status: Never     Passive exposure: Never    Smokeless tobacco: Never   Vaping Use    Vaping status: Never Used   Substance and Sexual Activity    Alcohol use: Never    Drug use: Never     Family History   Problem Relation Age of Onset    Other (peripartum cardiomyopathy) Mother       Allergies   Allergen Reactions    Pumpkin Seed Oil Unknown - Low Severity     All pumpkin    Peanut-Containing Drug Products Rash      Current Outpatient Medications on File Prior to Visit   Medication Sig Dispense Refill    budesonide (PULMICORT) 0.5 MG/2ML nebulizer solution Inhale 2 mL.      budesonide-formoterol (SYMBICORT) 160-4.5 MCG/ACT inhaler Inhale 2 puffs.      ferrous sulfate 325 (65 FE) MG tablet Take 1 tablet by mouth Daily With Breakfast. 30 tablet 4    ondansetron ODT (ZOFRAN-ODT) 4 MG disintegrating tablet Take 1 tablet by mouth Every 8 (Eight) Hours  "As Needed for Nausea or Vomiting. 30 tablet 0    Prenatal Vit-Fe Fumarate-FA (WesTab Plus) 27-1 MG tablet Take 1 tablet by mouth Daily.      Spacer/Aero-Holding Chambers (ProChamber VHC) device Inhale See Admin Instructions. with inhaler      [DISCONTINUED] predniSONE (DELTASONE) 10 MG tablet 4 tabs po daily for 4 days then 3 tabs po daily for 4 days then 2 tabs po daily for 4 days then 1 tab po daily for 4 days then stop. (Patient not taking: Reported on 10/3/2024)       No current facility-administered medications on file prior to visit.        Past obstetric, gynecological, medical, surgical, family and social history reviewed.  Relevant lab work and imaging reviewed.    Review of systems  Constitutional:  denies fever, chills, malaise.   ENT/Mouth:  denies sore throat, tinnitus  Eyes: denies vision changes/pain  CV:  denies chest pain  Respiratory:  denies cough/SOB  GI:  denies N/V, diarrhea, abdominal pain.    :   denies dysuria  Skin:  denies lesions or pruritus   Neuro:  denies weakness, focal neurologic symptoms    Vitals:    10/03/24 0908   BP: 128/77   BP Location: Right arm   Patient Position: Sitting   Pulse: 92   Temp: 98 °F (36.7 °C)   TempSrc: Temporal   SpO2: 96%   Weight: 77.5 kg (170 lb 12.8 oz)       Estimated body mass index is 32.27 kg/m² as calculated from the following:    Height as of 8/14/24: 154.9 cm (61\").    Weight as of this encounter: 77.5 kg (170 lb 12.8 oz).      PHYSICAL EXAM   GENERAL: Not in acute distress, AAOx3, pleasant  CARDIO: regular rate and rhythm  PULM: symmetric chest rise, speaking in complete sentences without difficulty  NEURO: awake, alert and oriented to person, place, and time  ABDOMINAL: No fundal tenderness, no rebound or guarding, gravid  EXTREMITIES: no bilateral lower extremity edema/tenderness  SKIN: Warm, well-perfused      ULTRASOUND   A full ultrasound report can be found in [facility].   Cephalic Presentation  Anterior Placenta  TONI 14 cm, which is " normal  EFW 1994 g (7%, AC <1%)  Sub-optimal anatomy due to late gestational age  UA dopplers forward flow (8 elevated) without absent or reversed flow      ASSESSMENT/COUNSELIN y.o.   35w3d gestation (Estimated Date of Delivery: 24).    -Pregnancy  [ X ] stable  [   ] improving [  ] worsening    Diagnoses and all orders for this visit:    1. Asthma complicating pregnancy, antepartum (Primary)    2. Fetal growth restriction antepartum  -     Toxoplasma Antibodies IgG / IgM  -     Cytomegalovirus Antibody, IgG  -     Cytomegalovirus Antibody, IgM  -     Parvovirus B19 Antibody, IgG & IgM         ASTHMA  Asthma has historically been associated with small increased risks of  birth, low birth weight,  mortality, and preeclampsia, but these risks are probably associated with just the undertreatment of asthma. With adequate treatment, asthma is NOT associated with a significant increase in adverse  outcomes. Pregnancy has a variable effect on asthma severity, which may improve, worsen, or remain unchanged. In general, about two-thirds get better and one-third get worse. The management of asthma in pregnant women should follow the same guidelines as for other patients. We discussed eliminating asthma triggers as a preventive method. We also discussed monitoring Peak Expiratory Flow (PEF) by using a peak flow meter. Inhalation therapy is preferred to systemic treatments, because of direct delivery to airway and fewer side effects. A stepwise approach to manage asthma is recommended to gain and maintain control.     FETAL GROWTH RESTRICTION, ELEVATED UMBILICAL ARTERY DOPPLERS  We discussed the potential underlying etiologies of fetal growth restriction including but not limited to fetal chromosome disorder, syndromes, congenital infection, constitutional, and placental insufficiency.  FGR is assoicated with increased risk of stillbirth, abnormal heart rates patterns,   delivery, oligohydramnios, neurodevelopmental dleay and cerebral palsy. She has had genetic screening this pregnancy which was low risk and no structural abnormalities seen on ultrasound, making aneuploidy/genetic causes less likely.  I did  her that I can never rule out genetic causes by ultrasound, but that her low risk cell free DNA screening was reassuring.  Cell free DNA does not check for all genetic issues.  With congenital infections, we often see profound CNS findings such as ventriculomegaly, echogenic bowel, and even hydrops.  We do not see any of this today.  I did order infection studies today.      I think the most likely cause of this is placental insufficiency. She has moderate asthma which is a risk factor but I counseled her that sometimes we do not know the cause and that this happens early on when the placenta is developing and it is nothing the patient did or didn't do and she did not cause this.  We discussed that once FGR with placental dysfunction is present, it typically worsens over time during the pregnancy.  Timing of delivery is guided by the results  testing of fetal well being (ultrasound BPP, FHR tracing findings) in addition to Doppler findings (umbilical artery, MCA, and ductus venosus).  Today we have elevated umbilical artery dopplers (1/8) with an 8/8 BPP which is reassuring,  We discussed umbilical artery doppler studies as 4 possible levels: normal, elevated, absent, and reversed.  We discussed that absent and reversed would require admission,  corticosteroids, and possibly delivery depending on gestational age and other testing.        I would recommend limitation of activities, daily fetal movement assessment, and twice weekly ANFS with BPP and Doppler studies.  A worsening of findings on testing would indicate an increased risk of indicated delivery, and inpatient management may be recommended at that time(with steroids).       Finally, we discussed that  placental insufficiency causing fetal growth restriction can be a heralding sign of pre-eclampsia.She has no evidence of this today, but should be observed for possible development as pregnancy progresses.  We discussed in detail symptoms of pre-eclampsia.         Summary of Plan  -Repeat Growth in 2 weeks  -Twice weekly ANFS (Once at MFM and once at primary OB)   -Ideally if possible, schedule about 2 - 3 days between weekly MFM and OB assessments.   -Careful attention to fetal movement and symptoms of pre-eclampsia  -Continue to follow with pulmonologist  -Continue all asthma meds as directed  -Continue to follow with OB for prenatal care  -Recommend delivery at 37 weeks     Follow-up: Weekly    Thank you for the consult and opportunity to care for this patient.  Please feel free to reach out with any questions or concerns.      I spent 45 minutes caring for this patient on this date of service. This time includes time spent by me in the following activities: preparing for the visit, reviewing tests, obtaining and/or reviewing a separately obtained history, performing a medically appropriate examination and/or evaluation, counseling and educating the patient/family/caregiver and independently interpreting results and communicating that information with the patient/family/caregiver with greater than 50% spent in counseling and coordination of care.     DEEPAK Khan  10/3/2024  Maternal Fetal Medicine-[facility]  Office: [phone]  [email or web address]

## 2024-10-03 NOTE — LETTER
October 3, 2024     Nan Wheat MD  [address]  [address]  [place] [id number]    Patient: Maria L Carlisle   YOB: 2005   Date of Visit: 10/3/2024       Dear Nan Wheat MD,    Thank you for referring Maria L Carlisle to me for evaluation. Below is a copy of my consult note.    If you have questions, please do not hesitate to call me. I look forward to following Maria L along with you.         Sincerely,        DEEPAK Watkins        CC: No Recipients                          MATERNAL FETAL MEDICINE CONSULT NOTE    Dear Dr Nan Wheat, *:    Thank you for your kind referral of Maria L Carlisle.  As you know, she is a 18 y.o.   35w3d gestation (Estimated Date of Delivery: 24). This is a consult.     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 Reports normal fetal movement.   She denies headache, vision changes, shortness of breath, acute changes in edema, and RUQ pain.     Review of History:  Past Medical History:   Diagnosis Date   • Asthma      History reviewed. No pertinent surgical history.    OB History    Para Term  AB Living   1 0 0 0 0 0   SAB IAB Ectopic Molar Multiple Live Births   0 0 0 0 0 0      # Outcome Date GA Lbr Ethan/2nd Weight Sex Type Anes PTL Lv   1 Current              Social History     Socioeconomic History   • Marital status: Single   Tobacco Use   • Smoking status: Never     Passive exposure: Never   • Smokeless tobacco: Never   Vaping Use   • Vaping status: Never Used   Substance and Sexual Activity   • Alcohol use: Never   • Drug use: Never     Family History   Problem Relation Age of Onset   • Other (peripartum cardiomyopathy) Mother       Allergies   Allergen Reactions   • Pumpkin  "Seed Oil Unknown - Low Severity     All pumpkin   • Peanut-Containing Drug Products Rash      Current Outpatient Medications on File Prior to Visit   Medication Sig Dispense Refill   • budesonide (PULMICORT) 0.5 MG/2ML nebulizer solution Inhale 2 mL.     • budesonide-formoterol (SYMBICORT) 160-4.5 MCG/ACT inhaler Inhale 2 puffs.     • ferrous sulfate 325 (65 FE) MG tablet Take 1 tablet by mouth Daily With Breakfast. 30 tablet 4   • ondansetron ODT (ZOFRAN-ODT) 4 MG disintegrating tablet Take 1 tablet by mouth Every 8 (Eight) Hours As Needed for Nausea or Vomiting. 30 tablet 0   • Prenatal Vit-Fe Fumarate-FA (WesTab Plus) 27-1 MG tablet Take 1 tablet by mouth Daily.     • Spacer/Aero-Holding Chambers (ProChamber VHC) device Inhale See Admin Instructions. with inhaler     • [DISCONTINUED] predniSONE (DELTASONE) 10 MG tablet 4 tabs po daily for 4 days then 3 tabs po daily for 4 days then 2 tabs po daily for 4 days then 1 tab po daily for 4 days then stop. (Patient not taking: Reported on 10/3/2024)       No current facility-administered medications on file prior to visit.        Past obstetric, gynecological, medical, surgical, family and social history reviewed.  Relevant lab work and imaging reviewed.    Review of systems  Constitutional:  denies fever, chills, malaise.   ENT/Mouth:  denies sore throat, tinnitus  Eyes: denies vision changes/pain  CV:  denies chest pain  Respiratory:  denies cough/SOB  GI:  denies N/V, diarrhea, abdominal pain.    :   denies dysuria  Skin:  denies lesions or pruritus   Neuro:  denies weakness, focal neurologic symptoms    Vitals:    10/03/24 0908   BP: 128/77   BP Location: Right arm   Patient Position: Sitting   Pulse: 92   Temp: 98 °F (36.7 °C)   TempSrc: Temporal   SpO2: 96%   Weight: 77.5 kg (170 lb 12.8 oz)       Estimated body mass index is 32.27 kg/m² as calculated from the following:    Height as of 8/14/24: 154.9 cm (61\").    Weight as of this encounter: 77.5 kg (170 lb " 12.8 oz).      PHYSICAL EXAM   GENERAL: Not in acute distress, AAOx3, pleasant  CARDIO: regular rate and rhythm  PULM: symmetric chest rise, speaking in complete sentences without difficulty  NEURO: awake, alert and oriented to person, place, and time  ABDOMINAL: No fundal tenderness, no rebound or guarding, gravid  EXTREMITIES: no bilateral lower extremity edema/tenderness  SKIN: Warm, well-perfused      ULTRASOUND   A full ultrasound report can be found in [facility].   Cephalic Presentation  Anterior Placenta  TONI 14 cm, which is normal  EFW 1994 g (7%, AC <1%)  Sub-optimal anatomy due to late gestational age  UA dopplers forward flow (8 elevated) without absent or reversed flow      ASSESSMENT/COUNSELIN y.o.   35w3d gestation (Estimated Date of Delivery: 24).    -Pregnancy  [ X ] stable  [   ] improving [  ] worsening    Diagnoses and all orders for this visit:    1. Asthma complicating pregnancy, antepartum (Primary)    2. Fetal growth restriction antepartum  -     Toxoplasma Antibodies IgG / IgM  -     Cytomegalovirus Antibody, IgG  -     Cytomegalovirus Antibody, IgM  -     Parvovirus B19 Antibody, IgG & IgM         ASTHMA  Asthma has historically been associated with small increased risks of  birth, low birth weight,  mortality, and preeclampsia, but these risks are probably associated with just the undertreatment of asthma. With adequate treatment, asthma is NOT associated with a significant increase in adverse  outcomes. Pregnancy has a variable effect on asthma severity, which may improve, worsen, or remain unchanged. In general, about two-thirds get better and one-third get worse. The management of asthma in pregnant women should follow the same guidelines as for other patients. We discussed eliminating asthma triggers as a preventive method. We also discussed monitoring Peak Expiratory Flow (PEF) by using a peak flow meter. Inhalation therapy is preferred to  systemic treatments, because of direct delivery to airway and fewer side effects. A stepwise approach to manage asthma is recommended to gain and maintain control.     FETAL GROWTH RESTRICTION, ELEVATED UMBILICAL ARTERY DOPPLERS  We discussed the potential underlying etiologies of fetal growth restriction including but not limited to fetal chromosome disorder, syndromes, congenital infection, constitutional, and placental insufficiency.  FGR is assoicated with increased risk of stillbirth, abnormal heart rates patterns,  delivery, oligohydramnios, neurodevelopmental dleay and cerebral palsy. She has had genetic screening this pregnancy which was low risk and no structural abnormalities seen on ultrasound, making aneuploidy/genetic causes less likely.  I did  her that I can never rule out genetic causes by ultrasound, but that her low risk cell free DNA screening was reassuring.  Cell free DNA does not check for all genetic issues.  With congenital infections, we often see profound CNS findings such as ventriculomegaly, echogenic bowel, and even hydrops.  We do not see any of this today.  I did order infection studies today.      I think the most likely cause of this is placental insufficiency. She has moderate asthma which is a risk factor but I counseled her that sometimes we do not know the cause and that this happens early on when the placenta is developing and it is nothing the patient did or didn't do and she did not cause this.  We discussed that once FGR with placental dysfunction is present, it typically worsens over time during the pregnancy.  Timing of delivery is guided by the results  testing of fetal well being (ultrasound BPP, FHR tracing findings) in addition to Doppler findings (umbilical artery, MCA, and ductus venosus).  Today we have elevated umbilical artery dopplers () with an 8/8 BPP which is reassuring,  We discussed umbilical artery doppler studies as 4 possible levels:  normal, elevated, absent, and reversed.  We discussed that absent and reversed would require admission,  corticosteroids, and possibly delivery depending on gestational age and other testing.        I would recommend limitation of activities, daily fetal movement assessment, and twice weekly ANFS with BPP and Doppler studies.  A worsening of findings on testing would indicate an increased risk of indicated delivery, and inpatient management may be recommended at that time(with steroids).       Finally, we discussed that placental insufficiency causing fetal growth restriction can be a heralding sign of pre-eclampsia.She has no evidence of this today, but should be observed for possible development as pregnancy progresses.  We discussed in detail symptoms of pre-eclampsia.         Summary of Plan  -Repeat Growth in 2 weeks  -Twice weekly ANFS (Once at MFM and once at primary OB)   -Ideally if possible, schedule about 2 - 3 days between weekly MFM and OB assessments.   -Careful attention to fetal movement and symptoms of pre-eclampsia  -Continue to follow with pulmonologist  -Continue all asthma meds as directed  -Continue to follow with OB for prenatal care  -Recommend delivery at 37 weeks     Follow-up: Weekly    Thank you for the consult and opportunity to care for this patient.  Please feel free to reach out with any questions or concerns.      I spent 45 minutes caring for this patient on this date of service. This time includes time spent by me in the following activities: preparing for the visit, reviewing tests, obtaining and/or reviewing a separately obtained history, performing a medically appropriate examination and/or evaluation, counseling and educating the patient/family/caregiver and independently interpreting results and communicating that information with the patient/family/caregiver with greater than 50% spent in counseling and coordination of care.     Isabell Felix,  APRN  10/3/2024  Maternal Fetal Medicine-[facility]  Office: [phone]  [email or web address]    Pt reports that she is doing well and denies vaginal bleeding or LOF at this time. Notes occasional abdominal tightness and cramping, denies consistency. Reports active fetal movement. Reviewed when to call OB office or present to L&D for evaluation with symptoms such as decreased fetal movement, vaginal bleeding, LOF or ctxs. Pt verbalized understanding. Denies HA, visual changes or epigastric pain. Denies any additional complaints at time of appointment. Next OB appointment scheduled for 10/10. Follows with [facility]. Pulse ox placed with readings of 95-98% noted today. Patient reports she has a pulse ox at home for further evaluation.     Vitals:    10/03/24 0908   BP: 128/77   Pulse: 92   Temp: 98 °F (36.7 °C)   SpO2: 96%

## 2024-10-04 LAB
CMV IGG SERPL IA-ACNC: >10 U/ML (ref 0–0.59)
CMV IGM SERPL IA-ACNC: <30 AU/ML (ref 0–29.9)
LABORATORY COMMENT REPORT: NORMAL
T GONDII IGG SERPL IA-ACNC: <3 IU/ML (ref 0–7.1)
T GONDII IGM SER IA-ACNC: <3 AU/ML (ref 0–7.9)

## 2024-10-07 LAB
B19V IGG SER IA-ACNC: 0.2 INDEX (ref 0–0.8)
B19V IGM SER IA-ACNC: 0.2 INDEX (ref 0–0.8)

## 2024-10-08 ENCOUNTER — OFFICE VISIT (OUTPATIENT)
Dept: OBSTETRICS AND GYNECOLOGY | Facility: CLINIC | Age: 19
End: 2024-10-08
Payer: COMMERCIAL

## 2024-10-08 ENCOUNTER — HOSPITAL ENCOUNTER (OUTPATIENT)
Dept: ULTRASOUND IMAGING | Facility: HOSPITAL | Age: 19
Discharge: HOME | End: 2024-10-08
Admitting: STUDENT IN AN ORGANIZED HEALTH CARE EDUCATION/TRAINING PROGRAM
Payer: COMMERCIAL

## 2024-10-08 VITALS
TEMPERATURE: 98 F | WEIGHT: 168.6 LBS | SYSTOLIC BLOOD PRESSURE: 131 MMHG | HEART RATE: 104 BPM | DIASTOLIC BLOOD PRESSURE: 81 MMHG | OXYGEN SATURATION: 98 % | HEIGHT: 61 IN | BODY MASS INDEX: 31.83 KG/M2

## 2024-10-08 DIAGNOSIS — O36.5990 FETAL GROWTH RESTRICTION ANTEPARTUM: Primary | ICD-10-CM

## 2024-10-08 PROCEDURE — 76820 UMBILICAL ARTERY ECHO: CPT

## 2024-10-08 PROCEDURE — 76819 FETAL BIOPHYS PROFIL W/O NST: CPT

## 2024-10-08 RX ORDER — FLUTICASONE PROPIONATE 50 UG/1
SPRAY, METERED NASAL
Status: ON HOLD | COMMUNITY
Start: 2024-08-23 | End: 2024-10-14

## 2024-10-08 NOTE — PROGRESS NOTES
MATERNAL FETAL MEDICINE CONSULT NOTE    Dear Dr Nan Wheat, *:    Thank you for your kind referral of Maria L Carlisle.  As you know, she is a 18 y.o.   36w1d gestation (Estimated Date of Delivery: 24). This is a consult.     Her antepartum course is complicated by:  Asthma  Class 1 Obesity  FGR int elevated dopplers    Aneuploidy Screening: Low Risk   Chapis Panorama Prenatal Test: Chromosomes 13, 18, 21, X & Y: Triploidy 22Q.11.2 Deletion - Blood, (2024 10:00)     HPI: Today, denies contractions, LOF, vaginal bleeding. Reports normal fetal movement.   She denies headache, vision changes, shortness of breath, acute changes in edema, and RUQ pain.     Review of History:  Past Medical History:   Diagnosis Date    Asthma      No past surgical history on file.    OB History    Para Term  AB Living   1 0 0 0 0 0   SAB IAB Ectopic Molar Multiple Live Births   0 0 0 0 0 0      # Outcome Date GA Lbr Ethan/2nd Weight Sex Type Anes PTL Lv   1 Current              Social History     Socioeconomic History    Marital status: Single   Tobacco Use    Smoking status: Never     Passive exposure: Never    Smokeless tobacco: Never   Vaping Use    Vaping status: Never Used   Substance and Sexual Activity    Alcohol use: Never    Drug use: Never     Family History   Problem Relation Age of Onset    Other (peripartum cardiomyopathy) Mother       Allergies   Allergen Reactions    Pumpkin Seed Oil Unknown - Low Severity     All pumpkin    Peanut-Containing Drug Products Rash      Current Outpatient Medications on File Prior to Visit   Medication Sig Dispense Refill    budesonide (PULMICORT) 0.5 MG/2ML nebulizer solution Inhale 2 mL.      budesonide-formoterol (SYMBICORT) 160-4.5 MCG/ACT inhaler Inhale 2 puffs.      ferrous sulfate 325 (65 FE) MG tablet Take 1 tablet by mouth Daily With Breakfast. 30 tablet 4    ondansetron ODT (ZOFRAN-ODT) 4 MG disintegrating tablet Take 1 tablet by mouth Every 8 (Eight)  "Hours As Needed for Nausea or Vomiting. 30 tablet 0    Prenatal Vit-Fe Fumarate-FA (WesTab Plus) 27-1 MG tablet Take 1 tablet by mouth Daily.      Spacer/Aero-Holding Chambers (ProChamber VHC) device Inhale See Admin Instructions. with inhaler      fluticasone (FLONASE) 50 MCG/ACT nasal spray  (Patient not taking: Reported on 10/8/2024)       No current facility-administered medications on file prior to visit.        Past obstetric, gynecological, medical, surgical, family and social history reviewed.  Relevant lab work and imaging reviewed.    Review of systems  Constitutional:  denies fever, chills, malaise.   ENT/Mouth:  denies sore throat, tinnitus  Eyes: denies vision changes/pain  CV:  denies chest pain  Respiratory:  denies cough/SOB  GI:  denies N/V, diarrhea, abdominal pain.    :   denies dysuria  Skin:  denies lesions or pruritus   Neuro:  denies weakness, focal neurologic symptoms    Vitals:    10/08/24 1239   BP: 131/81   BP Location: Right arm   Patient Position: Sitting   Pulse: 104   Temp: 98 °F (36.7 °C)   TempSrc: Temporal   SpO2: 98%   Weight: 76.5 kg (168 lb 9.6 oz)   Height: 154.3 cm (60.75\")       Estimated body mass index is 32.12 kg/m² as calculated from the following:    Height as of this encounter: 154.3 cm (60.75\").    Weight as of this encounter: 76.5 kg (168 lb 9.6 oz).      PHYSICAL EXAM   GENERAL: Not in acute distress, AAOx3, pleasant  CARDIO: regular rate and rhythm  PULM: symmetric chest rise, speaking in complete sentences without difficulty  NEURO: awake, alert and oriented to person, place, and time  ABDOMINAL: No fundal tenderness, no rebound or guarding, gravid  EXTREMITIES: no bilateral lower extremity edema/tenderness  SKIN: Warm, well-perfused      ULTRASOUND   A full ultrasound report can be found in ViewPoint.   Cephalic presentation.  Anterior placenta.   TONI 19 cm, which is normal.   BPP 8/8.   UA dopplers forward flow (2/6 elevated) without absent or reversed end " diastolic flow.        ASSESSMENT/COUNSELIN y.o.   36w1d gestation (Estimated Date of Delivery: 24).    -Pregnancy  [ X ] stable  [   ] improving [  ] worsening    Diagnoses and all orders for this visit:    1. Fetal growth restriction antepartum (Primary)           FETAL GROWTH RESTRICTION, ELEVATED UMBILICAL ARTERY DOPPLERS  Low risk NIPT.  I think the most likely cause of this is placental insufficiency. Today we have intermittently elevated umbilical artery dopplers (2/6) with an 8/8 BPP which is reassuring.  Would recommend delivery at 37 weeks--needs to be scheduled.       Finally, we discussed that placental insufficiency causing fetal growth restriction can be a heralding sign of pre-eclampsia.  She has no evidence of this today, but should be observed for possible development as pregnancy progresses.  We reviewed the symptoms of preeclampsia.       Summary of Plan  -Twice weekly ANFS (Once at MFM and once at primary OB)   -Ideally if possible, schedule about 2 - 3 days between weekly MFM and OB assessments.   -Careful attention to fetal movement and symptoms of pre-eclampsia  -Continue to follow with OB for prenatal care  -Recommend delivery at 37 weeks--pt needs to be scheduled     Follow-up: Weekly    Thank you for the consult and opportunity to care for this patient.  Please feel free to reach out with any questions or concerns.      I spent 18 minutes caring for this patient on this date of service. This time includes time spent by me in the following activities: preparing for the visit, reviewing tests, obtaining and/or reviewing a separately obtained history, performing a medically appropriate examination and/or evaluation, counseling and educating the patient/family/caregiver and independently interpreting results and communicating that information with the patient/family/caregiver with greater than 50% spent in counseling and coordination of care.     4 minutes reading US.     Es  CARLOS Solitario MD [facility]  Maternal Fetal Medicine-[facility]  Office: [phone]  [email or web address]

## 2024-10-08 NOTE — LETTER
2024     Nan Wheat MD  [address]  [address]  [place] [id number]    Patient: Maria L Carlisle   YOB: 2005   Date of Visit: 10/8/2024       Dear Nan Wheat MD    Maria L Carlisle was in my office today. Below is a copy of my note.    If you have questions, please do not hesitate to call me. I look forward to following Maria L along with you.         Sincerely,        Es Solitario MD      MATERNAL FETAL MEDICINE CONSULT NOTE    Dear Dr Nan Wheat, *:    Thank you for your kind referral of Maria L Carlisle.  As you know, she is a 18 y.o.   36w1d gestation (Estimated Date of Delivery: 24). This is a consult.     Her antepartum course is complicated by:  Asthma  Class 1 Obesity  FGR int elevated dopplers    Aneuploidy Screening: Low Risk   Chapis Panorama Prenatal Test: Chromosomes 13, 18, 21, X & Y: Triploidy 22Q.11.2 Deletion - Blood, (2024 10:00)     HPI: Today, denies contractions, LOF, vaginal bleeding. Reports normal fetal movement.   She denies headache, vision changes, shortness of breath, acute changes in edema, and RUQ pain.     Review of History:  Past Medical History:   Diagnosis Date   • Asthma      No past surgical history on file.    OB History    Para Term  AB Living   1 0 0 0 0 0   SAB IAB Ectopic Molar Multiple Live Births   0 0 0 0 0 0      # Outcome Date GA Lbr Ethan/2nd Weight Sex Type Anes PTL Lv   1 Current              Social History     Socioeconomic History   • Marital status: Single   Tobacco Use   • Smoking status: Never     Passive exposure: Never   • Smokeless tobacco: Never   Vaping Use   • Vaping status: Never Used   Substance and Sexual Activity   • Alcohol use: Never   • Drug use: Never     Family History   Problem Relation Age of Onset   • Other (peripartum cardiomyopathy) Mother       Allergies   Allergen Reactions   • Pumpkin Seed Oil Unknown - Low Severity     All pumpkin   • Peanut-Containing Drug  "Products Rash      Current Outpatient Medications on File Prior to Visit   Medication Sig Dispense Refill   • budesonide (PULMICORT) 0.5 MG/2ML nebulizer solution Inhale 2 mL.     • budesonide-formoterol (SYMBICORT) 160-4.5 MCG/ACT inhaler Inhale 2 puffs.     • ferrous sulfate 325 (65 FE) MG tablet Take 1 tablet by mouth Daily With Breakfast. 30 tablet 4   • ondansetron ODT (ZOFRAN-ODT) 4 MG disintegrating tablet Take 1 tablet by mouth Every 8 (Eight) Hours As Needed for Nausea or Vomiting. 30 tablet 0   • Prenatal Vit-Fe Fumarate-FA (WesTab Plus) 27-1 MG tablet Take 1 tablet by mouth Daily.     • Spacer/Aero-Holding Chambers (ProChamber VHC) device Inhale See Admin Instructions. with inhaler     • fluticasone (FLONASE) 50 MCG/ACT nasal spray  (Patient not taking: Reported on 10/8/2024)       No current facility-administered medications on file prior to visit.        Past obstetric, gynecological, medical, surgical, family and social history reviewed.  Relevant lab work and imaging reviewed.    Review of systems  Constitutional:  denies fever, chills, malaise.   ENT/Mouth:  denies sore throat, tinnitus  Eyes: denies vision changes/pain  CV:  denies chest pain  Respiratory:  denies cough/SOB  GI:  denies N/V, diarrhea, abdominal pain.    :   denies dysuria  Skin:  denies lesions or pruritus   Neuro:  denies weakness, focal neurologic symptoms    Vitals:    10/08/24 1239   BP: 131/81   BP Location: Right arm   Patient Position: Sitting   Pulse: 104   Temp: 98 °F (36.7 °C)   TempSrc: Temporal   SpO2: 98%   Weight: 76.5 kg (168 lb 9.6 oz)   Height: 154.3 cm (60.75\")       Estimated body mass index is 32.12 kg/m² as calculated from the following:    Height as of this encounter: 154.3 cm (60.75\").    Weight as of this encounter: 76.5 kg (168 lb 9.6 oz).      PHYSICAL EXAM   GENERAL: Not in acute distress, AAOx3, pleasant  CARDIO: regular rate and rhythm  PULM: symmetric chest rise, speaking in complete sentences without " difficulty  NEURO: awake, alert and oriented to person, place, and time  ABDOMINAL: No fundal tenderness, no rebound or guarding, gravid  EXTREMITIES: no bilateral lower extremity edema/tenderness  SKIN: Warm, well-perfused      ULTRASOUND   A full ultrasound report can be found in ViewPoint.   Cephalic presentation.  Anterior placenta.   TONI 19 cm, which is normal.   BPP 8/8.   UA dopplers forward flow (2/6 elevated) without absent or reversed end diastolic flow.        ASSESSMENT/COUNSELIN y.o.   36w1d gestation (Estimated Date of Delivery: 24).    -Pregnancy  [ X ] stable  [   ] improving [  ] worsening    Diagnoses and all orders for this visit:    1. Fetal growth restriction antepartum (Primary)           FETAL GROWTH RESTRICTION, ELEVATED UMBILICAL ARTERY DOPPLERS  Low risk NIPT.  I think the most likely cause of this is placental insufficiency. Today we have intermittently elevated umbilical artery dopplers (2/6) with an 8/8 BPP which is reassuring.  Would recommend delivery at 37 weeks--needs to be scheduled.       Finally, we discussed that placental insufficiency causing fetal growth restriction can be a heralding sign of pre-eclampsia.  She has no evidence of this today, but should be observed for possible development as pregnancy progresses.  We reviewed the symptoms of preeclampsia.       Summary of Plan  -Twice weekly ANFS (Once at MFM and once at primary OB)   -Ideally if possible, schedule about 2 - 3 days between weekly MFM and OB assessments.   -Careful attention to fetal movement and symptoms of pre-eclampsia  -Continue to follow with OB for prenatal care  -Recommend delivery at 37 weeks--pt needs to be scheduled     Follow-up: Weekly    Thank you for the consult and opportunity to care for this patient.  Please feel free to reach out with any questions or concerns.      I spent 18 minutes caring for this patient on this date of service. This time includes time spent by me in the  following activities: preparing for the visit, reviewing tests, obtaining and/or reviewing a separately obtained history, performing a medically appropriate examination and/or evaluation, counseling and educating the patient/family/caregiver and independently interpreting results and communicating that information with the patient/family/caregiver with greater than 50% spent in counseling and coordination of care.     4 minutes reading US.     Es Solitario MD FACOG  Maternal Fetal Medicine-[facility]  Office: [phone]  [email or web address]

## 2024-10-08 NOTE — PROGRESS NOTES
Pt reports that she is doing well and denies vaginal bleeding, cramping, or LOF at this time. Notes Marne Sood. Reports active fetal movement. Reviewed when to call OB office or present to L&D for evaluation with symptoms such as decreased fetal movement, vaginal bleeding, LOF or ctxs. Pt verbalized understanding. Denies HA, visual changes or epigastric pain. Denies any additional complaints at time of appointment. Next OB appointment scheduled for 10/10.    Vitals:    10/08/24 1239   BP: 131/81   Pulse: 104   Temp: 98 °F (36.7 °C)   SpO2: 98%

## 2024-10-10 ENCOUNTER — ROUTINE PRENATAL (OUTPATIENT)
Dept: OBSTETRICS AND GYNECOLOGY | Age: 19
End: 2024-10-10
Payer: COMMERCIAL

## 2024-10-10 VITALS — DIASTOLIC BLOOD PRESSURE: 92 MMHG | BODY MASS INDEX: 32.2 KG/M2 | SYSTOLIC BLOOD PRESSURE: 130 MMHG | WEIGHT: 169 LBS

## 2024-10-10 DIAGNOSIS — J45.41 MODERATE PERSISTENT ASTHMA WITH EXACERBATION: ICD-10-CM

## 2024-10-10 DIAGNOSIS — Z3A.36 36 WEEKS GESTATION OF PREGNANCY: Primary | ICD-10-CM

## 2024-10-10 DIAGNOSIS — O28.3 ECHOGENIC INTRACARDIAC FOCUS OF FETUS ON PRENATAL ULTRASOUND: ICD-10-CM

## 2024-10-10 DIAGNOSIS — O99.019 MATERNAL ANEMIA IN PREGNANCY, ANTEPARTUM: ICD-10-CM

## 2024-10-10 DIAGNOSIS — O36.5931 IUGR (INTRAUTERINE GROWTH RESTRICTION) AFFECTING CARE OF MOTHER, THIRD TRIMESTER, FETUS 1: ICD-10-CM

## 2024-10-10 NOTE — PROGRESS NOTES
Maria L Carlisle, a 18 y.o.  at 36w3d, presents for OB follow-up.  She is doing well today.  Denies loss of fluid, vaginal bleeding, and contractions.  Endorses fetal movements.    Objective  BP Readings from Last 3 Encounters:   10/10/24 130/92   10/08/24 131/81   10/03/24 128/77      Wt Readings from Last 3 Encounters:   10/10/24 76.7 kg (169 lb)   10/08/24 76.5 kg (168 lb 9.6 oz)   10/03/24 77.5 kg (170 lb 12.8 oz)      Total weight gain this pregnancy: 10.4 kg (23 lb)    General: Awake, alert, no apparent distress  Respiratory: No increased work of breathing  Abdomen: Fundus soft and nontender  Extremity: Nontender, no edema    A/P  Diagnoses and all orders for this visit:    1. 36 weeks gestation of pregnancy (Primary)  Overview:  -PNL: IOB labs reviewed, O+, plan for GBS at 36wga  -Pap: not yet indicated  -Genetics: cfDNA/msAFP/carrier > low risk, anatomy Us normal  -Imm: RI, VI, tdap 24  -Contraception: _  -Birthplan: _  -Care coordination: accepts blood transfusions, no latex allergy, call schedule reviewed         Orders:  -     POC Urinalysis Dipstick  -     Group B Streptococcus Culture - Swab, Vaginal/Rectum    2. Echogenic intracardiac focus of fetus on prenatal ultrasound  Overview:  - Genetic testing wnl  - We discussed that as an isolated finding with normal genetic testing this can be a normal variant, but can also be a marker of aneuploidy.       3. Maternal anemia in pregnancy, antepartum    4. Moderate persistent asthma with exacerbation  Overview:  - Admitted for suspected Pneumonia, + Rhinovirus on    - Asthma meds have been altered to include Symbicort and Prednisone PO  - Feels that she has had a difficult time controlling asthma since admission.   - Referral made to [facility] on 24  - Will continue with Q4 growth US  - Last growth 24 EFW 19%tile       5. IUGR (intrauterine growth restriction) affecting care of mother, third trimester, fetus 1  Overview:  - Status post [facility]  consultation  -Intermittent elevated Dopplers  -Recommendation for delivery at 37 weeks      Orders:  -     Labor Induction; Future        Labor precautions reviewed  No follow-ups on file.    Nan Wheat MD

## 2024-10-14 ENCOUNTER — ANESTHESIA (OUTPATIENT)
Dept: LABOR AND DELIVERY | Facility: HOSPITAL | Age: 19
End: 2024-10-14
Payer: COMMERCIAL

## 2024-10-14 ENCOUNTER — HOSPITAL ENCOUNTER (INPATIENT)
Facility: HOSPITAL | Age: 19
LOS: 3 days | Discharge: HOME | End: 2024-10-17
Attending: STUDENT IN AN ORGANIZED HEALTH CARE EDUCATION/TRAINING PROGRAM | Admitting: OBSTETRICS & GYNECOLOGY
Payer: COMMERCIAL

## 2024-10-14 ENCOUNTER — ANESTHESIA EVENT (OUTPATIENT)
Dept: LABOR AND DELIVERY | Facility: HOSPITAL | Age: 19
End: 2024-10-14

## 2024-10-14 ENCOUNTER — ANESTHESIA EVENT (OUTPATIENT)
Dept: LABOR AND DELIVERY | Facility: HOSPITAL | Age: 19
End: 2024-10-14
Payer: COMMERCIAL

## 2024-10-14 ENCOUNTER — HOSPITAL ENCOUNTER (OUTPATIENT)
Dept: LABOR AND DELIVERY | Facility: HOSPITAL | Age: 19
Discharge: HOME | End: 2024-10-14
Payer: COMMERCIAL

## 2024-10-14 ENCOUNTER — ANESTHESIA (OUTPATIENT)
Dept: LABOR AND DELIVERY | Facility: HOSPITAL | Age: 19
End: 2024-10-14

## 2024-10-14 PROBLEM — Z3A.37 37 WEEKS GESTATION OF PREGNANCY: Status: ACTIVE | Noted: 2024-06-05

## 2024-10-14 PROBLEM — Z34.90 PREGNANCY: Status: ACTIVE | Noted: 2024-10-14

## 2024-10-14 LAB
ABO GROUP BLD: NORMAL
ALBUMIN SERPL-MCNC: 3.7 G/DL (ref 3.5–5.2)
ALBUMIN/GLOB SERPL: 1.3 G/DL
ALP SERPL-CCNC: 129 U/L (ref 39–117)
ALT SERPL W P-5'-P-CCNC: 16 U/L (ref 1–33)
ANION GAP SERPL CALCULATED.3IONS-SCNC: 15 MMOL/L (ref 5–15)
AST SERPL-CCNC: 23 U/L (ref 1–32)
B-HEM STREP SPEC QL CULT: POSITIVE
BASOPHILS # BLD AUTO: 0.02 10*3/MM3 (ref 0–0.2)
BASOPHILS NFR BLD AUTO: 0.2 % (ref 0–1.5)
BILIRUB SERPL-MCNC: 0.2 MG/DL (ref 0–1.2)
BLD GP AB SCN SERPL QL: NEGATIVE
BUN SERPL-MCNC: 7 MG/DL (ref 6–20)
BUN/CREAT SERPL: 11.7 (ref 7–25)
CALCIUM SPEC-SCNC: 9.4 MG/DL (ref 8.6–10.5)
CHLORIDE SERPL-SCNC: 103 MMOL/L (ref 98–107)
CO2 SERPL-SCNC: 19 MMOL/L (ref 22–29)
CREAT SERPL-MCNC: 0.6 MG/DL (ref 0.57–1)
DEPRECATED RDW RBC AUTO: 45.2 FL (ref 37–54)
EGFRCR SERPLBLD CKD-EPI 2021: 132 ML/MIN/1.73
EOSINOPHIL # BLD AUTO: 0.49 10*3/MM3 (ref 0–0.4)
EOSINOPHIL NFR BLD AUTO: 3.9 % (ref 0.3–6.2)
ERYTHROCYTE [DISTWIDTH] IN BLOOD BY AUTOMATED COUNT: 13.4 % (ref 12.3–15.4)
GLOBULIN UR ELPH-MCNC: 2.9 GM/DL
GLUCOSE BLDC GLUCOMTR-MCNC: 105 MG/DL (ref 70–130)
GLUCOSE BLDC GLUCOMTR-MCNC: 46 MG/DL (ref 70–130)
GLUCOSE BLDC GLUCOMTR-MCNC: 48 MG/DL (ref 70–130)
GLUCOSE BLDC GLUCOMTR-MCNC: 51 MG/DL (ref 70–130)
GLUCOSE BLDC GLUCOMTR-MCNC: 59 MG/DL (ref 70–130)
GLUCOSE BLDC GLUCOMTR-MCNC: 73 MG/DL (ref 70–130)
GLUCOSE BLDC GLUCOMTR-MCNC: 86 MG/DL (ref 70–130)
GLUCOSE SERPL-MCNC: 70 MG/DL (ref 65–99)
GLUCOSE SERPL-MCNC: 91 MG/DL (ref 65–99)
HCT VFR BLD AUTO: 38 % (ref 34–46.6)
HGB BLD-MCNC: 12.4 G/DL (ref 12–15.9)
IMM GRANULOCYTES # BLD AUTO: 0.07 10*3/MM3 (ref 0–0.05)
IMM GRANULOCYTES NFR BLD AUTO: 0.6 % (ref 0–0.5)
LYMPHOCYTES # BLD AUTO: 2.91 10*3/MM3 (ref 0.7–3.1)
LYMPHOCYTES NFR BLD AUTO: 23 % (ref 19.6–45.3)
MCH RBC QN AUTO: 29.7 PG (ref 26.6–33)
MCHC RBC AUTO-ENTMCNC: 32.6 G/DL (ref 31.5–35.7)
MCV RBC AUTO: 90.9 FL (ref 79–97)
MONOCYTES # BLD AUTO: 0.65 10*3/MM3 (ref 0.1–0.9)
MONOCYTES NFR BLD AUTO: 5.1 % (ref 5–12)
NEUTROPHILS NFR BLD AUTO: 67.2 % (ref 42.7–76)
NEUTROPHILS NFR BLD AUTO: 8.51 10*3/MM3 (ref 1.7–7)
NRBC BLD AUTO-RTO: 0 /100 WBC (ref 0–0.2)
PLATELET # BLD AUTO: 233 10*3/MM3 (ref 140–450)
PMV BLD AUTO: 10.9 FL (ref 6–12)
POTASSIUM SERPL-SCNC: 3.7 MMOL/L (ref 3.5–5.2)
PROT SERPL-MCNC: 6.6 G/DL (ref 6–8.5)
RBC # BLD AUTO: 4.18 10*6/MM3 (ref 3.77–5.28)
RH BLD: POSITIVE
SODIUM SERPL-SCNC: 137 MMOL/L (ref 136–145)
T&S EXPIRATION DATE: NORMAL
TREPONEMA PALLIDUM IGG+IGM AB [PRESENCE] IN SERUM OR PLASMA BY IMMUNOASSAY: NORMAL
WBC NRBC COR # BLD AUTO: 12.65 10*3/MM3 (ref 3.4–10.8)

## 2024-10-14 PROCEDURE — 93005 ELECTROCARDIOGRAM TRACING: CPT | Performed by: STUDENT IN AN ORGANIZED HEALTH CARE EDUCATION/TRAINING PROGRAM

## 2024-10-14 PROCEDURE — 25010000002 ONDANSETRON PER 1 MG: Performed by: OBSTETRICS & GYNECOLOGY

## 2024-10-14 PROCEDURE — 80053 COMPREHEN METABOLIC PANEL: CPT | Performed by: OBSTETRICS & GYNECOLOGY

## 2024-10-14 PROCEDURE — C1755 CATHETER, INTRASPINAL: HCPCS

## 2024-10-14 PROCEDURE — 86900 BLOOD TYPING SEROLOGIC ABO: CPT | Performed by: OBSTETRICS & GYNECOLOGY

## 2024-10-14 PROCEDURE — 86901 BLOOD TYPING SEROLOGIC RH(D): CPT | Performed by: OBSTETRICS & GYNECOLOGY

## 2024-10-14 PROCEDURE — 85025 COMPLETE CBC W/AUTO DIFF WBC: CPT | Performed by: OBSTETRICS & GYNECOLOGY

## 2024-10-14 PROCEDURE — 82948 REAGENT STRIP/BLOOD GLUCOSE: CPT

## 2024-10-14 PROCEDURE — 93010 ELECTROCARDIOGRAM REPORT: CPT | Performed by: INTERNAL MEDICINE

## 2024-10-14 PROCEDURE — 25010000002 HYDROCORTISONE SOD SUC (PF) 100 MG RECONSTITUTED SOLUTION: Performed by: ANESTHESIOLOGY

## 2024-10-14 PROCEDURE — 25010000002 LIDOCAINE-EPINEPHRINE 2 %-1:200000 SOLUTION: Performed by: ANESTHESIOLOGY

## 2024-10-14 PROCEDURE — C1755 CATHETER, INTRASPINAL: HCPCS | Performed by: ANESTHESIOLOGY

## 2024-10-14 PROCEDURE — 25810000003 LACTATED RINGERS PER 1000 ML: Performed by: OBSTETRICS & GYNECOLOGY

## 2024-10-14 PROCEDURE — 86780 TREPONEMA PALLIDUM: CPT | Performed by: OBSTETRICS & GYNECOLOGY

## 2024-10-14 PROCEDURE — 82947 ASSAY GLUCOSE BLOOD QUANT: CPT | Performed by: STUDENT IN AN ORGANIZED HEALTH CARE EDUCATION/TRAINING PROGRAM

## 2024-10-14 PROCEDURE — 86850 RBC ANTIBODY SCREEN: CPT | Performed by: OBSTETRICS & GYNECOLOGY

## 2024-10-14 PROCEDURE — 25010000002 PENICILLIN G POTASSIUM PER 600000 UNITS: Performed by: STUDENT IN AN ORGANIZED HEALTH CARE EDUCATION/TRAINING PROGRAM

## 2024-10-14 RX ORDER — TERBUTALINE SULFATE 1 MG/ML
0.25 INJECTION, SOLUTION SUBCUTANEOUS AS NEEDED
Status: DISCONTINUED | OUTPATIENT
Start: 2024-10-14 | End: 2024-10-15

## 2024-10-14 RX ORDER — TRANEXAMIC ACID 10 MG/ML
1000 INJECTION, SOLUTION INTRAVENOUS ONCE AS NEEDED
Status: DISCONTINUED | OUTPATIENT
Start: 2024-10-14 | End: 2024-10-15

## 2024-10-14 RX ORDER — FAMOTIDINE 10 MG/ML
20 INJECTION, SOLUTION INTRAVENOUS 2 TIMES DAILY PRN
Status: DISCONTINUED | OUTPATIENT
Start: 2024-10-14 | End: 2024-10-15

## 2024-10-14 RX ORDER — ONDANSETRON 4 MG/1
4 TABLET, ORALLY DISINTEGRATING ORAL EVERY 6 HOURS PRN
Status: DISCONTINUED | OUTPATIENT
Start: 2024-10-14 | End: 2024-10-15

## 2024-10-14 RX ORDER — IPRATROPIUM BROMIDE AND ALBUTEROL SULFATE 2.5; .5 MG/3ML; MG/3ML
3 SOLUTION RESPIRATORY (INHALATION)
Status: DISPENSED | OUTPATIENT
Start: 2024-10-14 | End: 2024-10-16

## 2024-10-14 RX ORDER — ONDANSETRON 2 MG/ML
4 INJECTION INTRAMUSCULAR; INTRAVENOUS ONCE AS NEEDED
Status: DISCONTINUED | OUTPATIENT
Start: 2024-10-14 | End: 2024-10-15 | Stop reason: HOSPADM

## 2024-10-14 RX ORDER — EPHEDRINE SULFATE 50 MG/ML
5 INJECTION, SOLUTION INTRAVENOUS
Status: CANCELLED | OUTPATIENT
Start: 2024-10-14

## 2024-10-14 RX ORDER — SODIUM CHLORIDE 9 MG/ML
40 INJECTION, SOLUTION INTRAVENOUS AS NEEDED
Status: DISCONTINUED | OUTPATIENT
Start: 2024-10-14 | End: 2024-10-15

## 2024-10-14 RX ORDER — SODIUM CHLORIDE 0.9 % (FLUSH) 0.9 %
10 SYRINGE (ML) INJECTION EVERY 12 HOURS SCHEDULED
Status: DISCONTINUED | OUTPATIENT
Start: 2024-10-14 | End: 2024-10-15

## 2024-10-14 RX ORDER — FENTANYL/ROPIVACAINE/NS/PF 2MCG/ML-.2
10 PLASTIC BAG, INJECTION (ML) INJECTION CONTINUOUS
Status: CANCELLED | OUTPATIENT
Start: 2024-10-14 | End: 2024-10-17

## 2024-10-14 RX ORDER — MAGNESIUM CARB/ALUMINUM HYDROX 105-160MG
30 TABLET,CHEWABLE ORAL ONCE AS NEEDED
Status: DISCONTINUED | OUTPATIENT
Start: 2024-10-14 | End: 2024-10-15

## 2024-10-14 RX ORDER — FAMOTIDINE 10 MG/ML
20 INJECTION, SOLUTION INTRAVENOUS ONCE AS NEEDED
Status: DISCONTINUED | OUTPATIENT
Start: 2024-10-14 | End: 2024-10-15 | Stop reason: HOSPADM

## 2024-10-14 RX ORDER — OXYTOCIN/0.9 % SODIUM CHLORIDE 30/500 ML
250 PLASTIC BAG, INJECTION (ML) INTRAVENOUS CONTINUOUS
Status: ACTIVE | OUTPATIENT
Start: 2024-10-14 | End: 2024-10-14

## 2024-10-14 RX ORDER — SODIUM CHLORIDE 0.9 % (FLUSH) 0.9 %
10 SYRINGE (ML) INJECTION AS NEEDED
Status: DISCONTINUED | OUTPATIENT
Start: 2024-10-14 | End: 2024-10-15

## 2024-10-14 RX ORDER — METHYLERGONOVINE MALEATE 0.2 MG/ML
200 INJECTION INTRAVENOUS ONCE AS NEEDED
Status: DISCONTINUED | OUTPATIENT
Start: 2024-10-14 | End: 2024-10-15

## 2024-10-14 RX ORDER — IPRATROPIUM BROMIDE AND ALBUTEROL SULFATE 2.5; .5 MG/3ML; MG/3ML
SOLUTION RESPIRATORY (INHALATION)
Status: ACTIVE
Start: 2024-10-14 | End: 2024-10-15

## 2024-10-14 RX ORDER — ONDANSETRON 2 MG/ML
4 INJECTION INTRAMUSCULAR; INTRAVENOUS ONCE AS NEEDED
Status: CANCELLED | OUTPATIENT
Start: 2024-10-14

## 2024-10-14 RX ORDER — DIPHENHYDRAMINE HYDROCHLORIDE 50 MG/ML
25 INJECTION INTRAMUSCULAR; INTRAVENOUS NIGHTLY PRN
Status: DISCONTINUED | OUTPATIENT
Start: 2024-10-14 | End: 2024-10-15

## 2024-10-14 RX ORDER — DIPHENHYDRAMINE HYDROCHLORIDE 50 MG/ML
12.5 INJECTION INTRAMUSCULAR; INTRAVENOUS EVERY 8 HOURS PRN
Status: DISCONTINUED | OUTPATIENT
Start: 2024-10-14 | End: 2024-10-15 | Stop reason: HOSPADM

## 2024-10-14 RX ORDER — EPHEDRINE SULFATE 50 MG/ML
5 INJECTION, SOLUTION INTRAVENOUS AS NEEDED
Status: DISCONTINUED | OUTPATIENT
Start: 2024-10-14 | End: 2024-10-15 | Stop reason: HOSPADM

## 2024-10-14 RX ORDER — FENTANYL CITRATE 50 UG/ML
50 INJECTION, SOLUTION INTRAMUSCULAR; INTRAVENOUS
Status: DISCONTINUED | OUTPATIENT
Start: 2024-10-14 | End: 2024-10-15

## 2024-10-14 RX ORDER — IPRATROPIUM BROMIDE AND ALBUTEROL SULFATE 2.5; .5 MG/3ML; MG/3ML
3 SOLUTION RESPIRATORY (INHALATION)
Status: DISCONTINUED | OUTPATIENT
Start: 2024-10-14 | End: 2024-10-15

## 2024-10-14 RX ORDER — ONDANSETRON 2 MG/ML
4 INJECTION INTRAMUSCULAR; INTRAVENOUS EVERY 6 HOURS PRN
Status: DISCONTINUED | OUTPATIENT
Start: 2024-10-14 | End: 2024-10-15

## 2024-10-14 RX ORDER — SODIUM CHLORIDE, SODIUM LACTATE, POTASSIUM CHLORIDE, CALCIUM CHLORIDE 600; 310; 30; 20 MG/100ML; MG/100ML; MG/100ML; MG/100ML
25 INJECTION, SOLUTION INTRAVENOUS CONTINUOUS
Status: DISCONTINUED | OUTPATIENT
Start: 2024-10-14 | End: 2024-10-15

## 2024-10-14 RX ORDER — MISOPROSTOL 200 UG/1
800 TABLET ORAL ONCE AS NEEDED
Status: COMPLETED | OUTPATIENT
Start: 2024-10-14 | End: 2024-10-15

## 2024-10-14 RX ORDER — ACETAMINOPHEN 325 MG/1
650 TABLET ORAL EVERY 4 HOURS PRN
Status: DISCONTINUED | OUTPATIENT
Start: 2024-10-14 | End: 2024-10-15 | Stop reason: ALTCHOICE

## 2024-10-14 RX ORDER — FAMOTIDINE 10 MG/ML
20 INJECTION, SOLUTION INTRAVENOUS ONCE AS NEEDED
Status: CANCELLED | OUTPATIENT
Start: 2024-10-14

## 2024-10-14 RX ORDER — DIPHENHYDRAMINE HCL 25 MG
25 CAPSULE ORAL NIGHTLY PRN
Status: DISCONTINUED | OUTPATIENT
Start: 2024-10-14 | End: 2024-10-15

## 2024-10-14 RX ORDER — CARBOPROST TROMETHAMINE 250 UG/ML
250 INJECTION, SOLUTION INTRAMUSCULAR
Status: DISCONTINUED | OUTPATIENT
Start: 2024-10-14 | End: 2024-10-15

## 2024-10-14 RX ORDER — LIDOCAINE HYDROCHLORIDE 10 MG/ML
0.5 INJECTION, SOLUTION INFILTRATION; PERINEURAL ONCE AS NEEDED
Status: DISCONTINUED | OUTPATIENT
Start: 2024-10-14 | End: 2024-10-15

## 2024-10-14 RX ORDER — OXYTOCIN/0.9 % SODIUM CHLORIDE 30/500 ML
2-20 PLASTIC BAG, INJECTION (ML) INTRAVENOUS
Status: DISCONTINUED | OUTPATIENT
Start: 2024-10-14 | End: 2024-10-15

## 2024-10-14 RX ORDER — DIPHENHYDRAMINE HYDROCHLORIDE 50 MG/ML
12.5 INJECTION INTRAMUSCULAR; INTRAVENOUS EVERY 8 HOURS PRN
Status: CANCELLED | OUTPATIENT
Start: 2024-10-14

## 2024-10-14 RX ORDER — FENTANYL/ROPIVACAINE/NS/PF 2MCG/ML-.2
10 PLASTIC BAG, INJECTION (ML) INJECTION CONTINUOUS
Status: DISCONTINUED | OUTPATIENT
Start: 2024-10-14 | End: 2024-10-15

## 2024-10-14 RX ORDER — LIDOCAINE HCL/EPINEPHRINE/PF 2%-1:200K
VIAL (ML) INJECTION AS NEEDED
Status: DISCONTINUED | OUTPATIENT
Start: 2024-10-14 | End: 2024-10-15 | Stop reason: SURG

## 2024-10-14 RX ORDER — OXYTOCIN/0.9 % SODIUM CHLORIDE 30/500 ML
999 PLASTIC BAG, INJECTION (ML) INTRAVENOUS ONCE
Status: DISCONTINUED | OUTPATIENT
Start: 2024-10-14 | End: 2024-10-15

## 2024-10-14 RX ORDER — PENICILLIN G 3000000 [IU]/50ML
3 INJECTION, SOLUTION INTRAVENOUS EVERY 4 HOURS
Status: DISCONTINUED | OUTPATIENT
Start: 2024-10-14 | End: 2024-10-15 | Stop reason: HOSPADM

## 2024-10-14 RX ORDER — FAMOTIDINE 20 MG/1
20 TABLET, FILM COATED ORAL 2 TIMES DAILY PRN
Status: DISCONTINUED | OUTPATIENT
Start: 2024-10-14 | End: 2024-10-15

## 2024-10-14 RX ADMIN — EPHEDRINE SULFATE 5 MG: 50 INJECTION INTRAVENOUS at 16:12

## 2024-10-14 RX ADMIN — SODIUM CHLORIDE 5 MILLION UNITS: 900 INJECTION INTRAVENOUS at 13:11

## 2024-10-14 RX ADMIN — LIDOCAINE HYDROCHLORIDE AND EPINEPHRINE 3 ML: 20; 5 INJECTION, SOLUTION EPIDURAL; INFILTRATION; INTRACAUDAL; PERINEURAL at 15:25

## 2024-10-14 RX ADMIN — HYDROCORTISONE SODIUM SUCCINATE 100 MG: 100 INJECTION, POWDER, FOR SOLUTION INTRAMUSCULAR; INTRAVENOUS at 17:18

## 2024-10-14 RX ADMIN — Medication 2 MILLI-UNITS/MIN: at 13:16

## 2024-10-14 RX ADMIN — PENICILLIN G 3 MILLION UNITS: 3000000 INJECTION, SOLUTION INTRAVENOUS at 21:01

## 2024-10-14 RX ADMIN — Medication 10 ML/HR: at 15:28

## 2024-10-14 RX ADMIN — PENICILLIN G 3 MILLION UNITS: 3000000 INJECTION, SOLUTION INTRAVENOUS at 16:48

## 2024-10-14 RX ADMIN — ONDANSETRON 4 MG: 2 INJECTION, SOLUTION INTRAMUSCULAR; INTRAVENOUS at 16:14

## 2024-10-14 RX ADMIN — SODIUM CHLORIDE, POTASSIUM CHLORIDE, SODIUM LACTATE AND CALCIUM CHLORIDE 25 ML/HR: 600; 310; 30; 20 INJECTION, SOLUTION INTRAVENOUS at 16:20

## 2024-10-14 RX ADMIN — Medication 2 MILLI-UNITS/MIN: at 19:32

## 2024-10-14 RX ADMIN — DINOPROSTONE 10 MG: 10 INSERT VAGINAL at 01:24

## 2024-10-14 RX ADMIN — SODIUM CHLORIDE, POTASSIUM CHLORIDE, SODIUM LACTATE AND CALCIUM CHLORIDE 25 ML/HR: 600; 310; 30; 20 INJECTION, SOLUTION INTRAVENOUS at 13:08

## 2024-10-14 NOTE — ANESTHESIA PREPROCEDURE EVALUATION
Anesthesia Evaluation     Patient summary reviewed and Nursing notes reviewed   NPO Solid Status: > 8 hours  NPO Liquid Status: > 2 hours           Airway   Mallampati: II  TM distance: >3 FB  Neck ROM: full  No difficulty expected  Dental - normal exam     Pulmonary    (+) asthma,  Cardiovascular - negative cardio ROS    Rhythm: regular        Neuro/Psych- negative ROS  GI/Hepatic/Renal/Endo - negative ROS     Musculoskeletal (-) negative ROS    Abdominal    Substance History - negative use     OB/GYN    (+) Pregnant        Other                    Anesthesia Plan    ASA 2     epidural     (Intrauterine pregnancy at 37w0d)    Anesthetic plan, risks, benefits, and alternatives have been provided, discussed and informed consent has been obtained with: patient.    CODE STATUS:    Level Of Support Discussed With: Patient  Code Status (Patient has no pulse and is not breathing): CPR (Attempt to Resuscitate)  Medical Interventions (Patient has pulse or is breathing): Full Support

## 2024-10-14 NOTE — PROGRESS NOTES
I was called to patient room for evaluation of patient.  RN reported that patient told her chest felt tight and then she stopped responding.  Upon my arrival, patient had her eyes closed and was not responding with voice but was taking deep breaths that appeared strained and nodding in response to my questions.  Blood pressure was normal.  Heart rate was tachycardic.  Pulse ox showed 84%.  Bilateral wheezing was noted upon lung auscultation.  Stat DuoNeb via respiratory therapy was ordered.  Albuterol inhaler at bedside was used to give 2 puffs.  O2 saturation improved to 100% but patient was still taking strange breaths.  Anesthesiologist and rapid response team soon arrived.  IV hydrocortisone was ordered.  Respiratory therapy administered DuoNeb.  Glucose was checked and found to be 51.  Patient was more reliably responding and able to drink apple juice.  She soon was feeling much better.  Vitals were stable and O2 saturation is 100% on room air.  EFM remained category 1 throughout the entire episode.    Patient rested for the next 1.5 hours.  I discussed with her, her mother, and her grandmother what had occurred.  I suspect a combination of asthma attack, hypoglycemia, and anxiety attack resulted in the episode as described above.  Since resolution, she feels great.  Fetal status has remained reassuring.  Discussed that it would be reasonable to restart induction.  She excitedly agreed and requested amniotomy.  Cervix was 4/90/0.  Amniotomy was performed with clear fluid noted.  Pitocin ordered.  Duonebs scheduled q6 x 48 hours.    Dora Batres MD  10/14/24  19:18 EDT

## 2024-10-14 NOTE — H&P
[facility]   Obstetrics and Gynecology   History & Physical    10/14/2024    Patient: Maria L Carlisle          MR#:[id number]    Chief complaint: Fetal growth restriction with intermittently elevated Dopplers    Subjective     18 y.o. female  at 37w0d  with fetal growth depression with intermittently elevated Dopplers presents for induction of labor.  She is doing well.  She received Cervidil overnight and is feeling regular contractions.  Denies loss of fluid and vaginal bleeding.  Endorses regular fetal movements.  EFM has been category 1.    Pregnancy is otherwise complicated by an echogenic intracardiac focus.  Cell free DNA is low risk and other anatomy is normal.      She has a history of asthma.  She developed pneumonia earlier this pregnancy and was hospitalized for worsening respiratory symptoms.  In the early third trimester, pulmonologist recommended a prednisone taper and continued budesonide nebulizer twice daily.  With Symbicort as a rescue device throughout the day.      Patient Active Problem List   Diagnosis    37 weeks gestation of pregnancy    Echogenic intracardiac focus of fetus on prenatal ultrasound    Maternal anemia in pregnancy, antepartum    Moderate persistent asthma with exacerbation    IUGR (intrauterine growth restriction) affecting care of mother, third trimester, fetus 1    Pregnancy       Past Medical History:   Diagnosis Date    Asthma        History reviewed. No pertinent surgical history.    Obstetric History:  OB History          1    Para        Term                AB        Living             SAB        IAB        Ectopic        Molar        Multiple        Live Births                   Menstrual History:     Patient's last menstrual period was 2024.       # 1 - Date: None, Sex: None, Weight: None, GA: None, Type: None, Apgar1: None, Apgar5: None, Living: None, Birth Comments: None      Prenatal Information:  Prenatal Results       Initial  Prenatal Labs       Test Value Reference Range Date Time    Hemoglobin ^ 12.3 g/dL 12.0 - 16.0 05/31/24 2010    Hematocrit ^ 36.9 % 36.0 - 46.0 05/31/24 2010    Platelets ^ 264 10*3/uL 140 - 440 05/31/24 2010    Rubella IgG ^ Immune   04/02/24     Hepatitis B SAg ^ Negative   04/02/24     Hepatitis C Ab ^ nonreactive   04/02/24     RPR  Non Reactive  Non Reactive 08/01/24 1008    T. Pallidum Ab   Non-Reactive  Non-Reactive 10/14/24 0053      ^ nonreactive   04/02/24     ABO  O   10/14/24 0053    Rh  Positive   10/14/24 0053    Antibody Screen ^ Normal  Normal 04/02/24     HIV ^ nonreactive   04/02/24     Urine Culture  No growth   08/14/24 2146       Final report   06/05/24 1558    Gonorrhea  Negative  Negative 06/05/24 1600    Chlamydia  Negative  Negative 06/05/24 1600    TSH        HgB A1c         Varicella IgG        Hemoglobinopathy Fractionation        Hemoglobinopathy (genetic testing) ^ Normal   04/02/24     Cystic fibrosis                   Fetal testing        Test Value Reference Range Date Time    NIPT        MSAFP        AFP-4                  2nd and 3rd Trimester       Test Value Reference Range Date Time    Hemoglobin (repeated)  12.4 g/dL 12.0 - 15.9 10/14/24 0053      ^ 10.0 g/dL 12.0 - 16.0 08/23/24 0526      ^ 11.3 g/dL 12.0 - 16.0 08/22/24 0437      ^ 12.0 g/dL 12.0 - 16.0 08/21/24 1603       11.7 g/dL 11.1 - 15.9 08/01/24 1008    Hematocrit (repeated)  38.0 % 34.0 - 46.6 10/14/24 0053      ^ 29.8 % 36.0 - 46.0 08/23/24 0526      ^ 33.4 % 36.0 - 46.0 08/22/24 0437      ^ 36.1 % 36.0 - 46.0 08/21/24 1603       36.6 % 34.0 - 46.6 08/01/24 1008    Platelets   233 10*3/mm3 140 - 450 10/14/24 0053      ^ 194 10*3/uL 140 - 440 08/23/24 0526      ^ 205 10*3/uL 140 - 440 08/22/24 0437      ^ 226 10*3/uL 140 - 440 08/21/24 1603       246 x10E3/uL 150 - 450 08/01/24 1008      ^ 264 10*3/uL 140 - 440 05/31/24 2010    1 hour GTT   89 mg/dL 70 - 139 08/01/24 1008    Antibody Screen (repeated)  Negative    10/14/24 0053    3rd TM syphilis scrn (repeated)  RPR   Non Reactive  Non Reactive 08/01/24 1008    3rd TM syphilis scrn (repeated) TP-Ab  Non-Reactive  Non-Reactive 10/14/24 0053    3rd TM syphilis screen TB-Ab (FTA)  Non-Reactive  Non-Reactive 10/14/24 0053    Syphilis cascade test TP-Ab (EIA)        Syphilis cascade TPPA        GTT Fasting        GTT 1 Hr        GTT 2 Hr        GTT 3 Hr        Group B Strep                  Other testing        Test Value Reference Range Date Time    Parvo IgG   0.2 index 0.0 - 0.8 10/03/24 1012    CMV IgG   >10.00 U/mL 0.00 - 0.59 10/03/24 1012              Drug Screening       Test Value Reference Range Date Time    Amphetamine Screen        Barbiturate Screen        Benzodiazepine Screen        Methadone Screen        Phencyclidine Screen        Opiates Screen        THC Screen        Cocaine Screen        Propoxyphene Screen        Buprenorphine Screen        Methamphetamine Screen        Oxycodone Screen        Tricyclic Antidepressants Screen                  Legend    ^: Historical                          External Prenatal Results       Pregnancy Outside Results - Transcribed From Office Records - See Scanned Records For Details       Test Value Date Time    ABO  O  10/14/24 0053    Rh  Positive  10/14/24 0053    Antibody Screen  Negative  10/14/24 0053      ^ Normal  04/02/24     Varicella IgG       Rubella ^ Immune  04/02/24     Hgb  12.4 g/dL 10/14/24 0053      ^ 10.0 g/dL 08/23/24 0526      ^ 11.3 g/dL 08/22/24 0437      ^ 12.0 g/dL 08/21/24 1603       11.7 g/dL 08/01/24 1008      ^ 12.3 g/dL 05/31/24 2010    Hct  38.0 % 10/14/24 0053      ^ 29.8 % 08/23/24 0526      ^ 33.4 % 08/22/24 0437      ^ 36.1 % 08/21/24 1603       36.6 % 08/01/24 1008      ^ 36.9 % 05/31/24 2010    HgB A1c        1h GTT  89 mg/dL 08/01/24 1008    3h GTT Fasting       3h GTT 1 hour       3h GTT 2 hour       3h GTT 3 hour        Gonorrhea (discrete)  Negative  06/05/24 1600    Chlamydia  (discrete)  Negative  06/05/24 1600    RPR  Non Reactive  08/01/24 1008    Syphils cascade: TP-Ab (FTA)  Non-Reactive  10/14/24 0053      ^ nonreactive  04/02/24     TP-Ab  Non-Reactive  10/14/24 0053      ^ nonreactive  04/02/24     TP-Ab (EIA)       TPPA       HBsAg ^ Negative  04/02/24     Herpes Simplex Virus PCR       Herpes Simplex VIrus Culture       HIV ^ nonreactive  04/02/24     Hep C RNA Quant PCR       Hep C Antibody ^ nonreactive  04/02/24     AFP       NIPT       Cystic Fibroisis        Group B Strep       GBS Susceptibility to Clindamycin       GBS Susceptibility to Erythromycin       Fetal Fibronectin       Genetic Testing, Maternal Blood                 Drug Screening       Test Value Date Time    Urine Drug Screen       Amphetamine Screen       Barbiturate Screen       Benzodiazepine Screen       Methadone Screen       Phencyclidine Screen       Opiates Screen       THC Screen       Cocaine Screen       Propoxyphene Screen       Buprenorphine Screen       Methamphetamine Screen       Oxycodone Screen       Tricyclic Antidepressants Screen                 Legend    ^: Historical                              Family History   Problem Relation Age of Onset    Other (peripartum cardiomyopathy) Mother        Social History     Tobacco Use    Smoking status: Never     Passive exposure: Never    Smokeless tobacco: Never   Vaping Use    Vaping status: Never Used   Substance Use Topics    Alcohol use: Never    Drug use: Never       Pumpkin seed oil and Peanut-containing drug products      Current Facility-Administered Medications:     acetaminophen (TYLENOL) tablet 650 mg, 650 mg, Oral, Q4H PRN, Ivania Su MD    carboprost (HEMABATE) injection 250 mcg, 250 mcg, Intramuscular, Q15 Min PRN, Ivania Su MD    diphenhydrAMINE (BENADRYL) capsule 25 mg, 25 mg, Oral, Nightly PRN **OR** diphenhydrAMINE (BENADRYL) injection 25 mg, 25 mg, Intravenous, Nightly PRN, Ivania Su MD    diphenhydrAMINE  (BENADRYL) injection 12.5 mg, 12.5 mg, Intravenous, Q8H PRN, Matthew Gordon MD    ePHEDrine injection 5 mg, 5 mg, Intravenous, PRN, Matthew Gordon MD    famotidine (PEPCID) injection 20 mg, 20 mg, Intravenous, BID PRN **OR** famotidine (PEPCID) tablet 20 mg, 20 mg, Oral, BID PRN, Ivania Su MD    famotidine (PEPCID) injection 20 mg, 20 mg, Intravenous, Once PRN, Matthew Gordon MD    fentaNYL 2mcg/mL and ropivacaine 0.2% in NS epidural 100mL, 10 mL/hr, Epidural, Continuous, Matthew Gordon MD    fentaNYL citrate (PF) (SUBLIMAZE) injection 50 mcg, 50 mcg, Intravenous, Q1H PRN, Ivania Su MD    lactated ringers infusion, 25 mL/hr, Intravenous, Continuous, Ivania Su MD    lidocaine (XYLOCAINE) 1 % injection 0.5 mL, 0.5 mL, Intradermal, Once PRN, Ivania Su MD    methylergonovine (METHERGINE) injection 200 mcg, 200 mcg, Intramuscular, Once PRN, Ivania Su MD    mineral oil liquid 30 mL, 30 mL, Topical, Once PRN, Ivania Su MD    miSOPROStol (CYTOTEC) tablet 800 mcg, 800 mcg, Rectal, Once PRN, Ivania Su MD    ondansetron ODT (ZOFRAN-ODT) disintegrating tablet 4 mg, 4 mg, Oral, Q6H PRN **OR** ondansetron (ZOFRAN) injection 4 mg, 4 mg, Intravenous, Q6H PRN, Ivania Su MD    ondansetron (ZOFRAN) injection 4 mg, 4 mg, Intravenous, Once PRN, Matthew Gordon MD    oxytocin (PITOCIN) 30 units in 0.9% sodium chloride 500 mL (premix), 999 mL/hr, Intravenous, Once **FOLLOWED BY** [] oxytocin (PITOCIN) 30 units in 0.9% sodium chloride 500 mL (premix), 250 mL/hr, Intravenous, Continuous, Ivania Su MD    sodium chloride 0.9 % flush 10 mL, 10 mL, Intravenous, Q12H, Ivania Su MD    sodium chloride 0.9 % flush 10 mL, 10 mL, Intravenous, PRN, Ivania Su MD    sodium chloride 0.9 % infusion 40 mL, 40 mL, Intravenous, PRN, Ivania Su MD    terbutaline (BRETHINE) injection 0.25 mg, 0.25 mg, Subcutaneous,  Georges MANZANO Kristin, MD    tranexamic acid 1000 mg in 100 mL 0.7% NaCl infusion (premix), 1,000 mg, Intravenous, Once Georges MANZANO Kristin, MD    Review of Systems  Review of Systems   All other systems reviewed and are negative.      Objective     Vital Signs  Temp:  [97.7 °F (36.5 °C)-98.3 °F (36.8 °C)] 97.9 °F (36.6 °C)  Heart Rate:  [91-97] 91  Resp:  [16] 16  BP: ()/(60-79) 121/79    Physical Exam:  Physical Exam  Vitals and nursing note reviewed.   Constitutional:       General: She is not in acute distress.     Appearance: Normal appearance.   HENT:      Head: Normocephalic and atraumatic.   Eyes:      Extraocular Movements: Extraocular movements intact.   Cardiovascular:      Rate and Rhythm: Normal rate.   Pulmonary:      Effort: Pulmonary effort is normal. No respiratory distress.   Abdominal:      General: There is no distension.      Palpations: Abdomen is soft. There is no mass.      Tenderness: There is no abdominal tenderness.   Genitourinary:     Comments: Cervix 2/60/-2  Musculoskeletal:         General: Normal range of motion.      Cervical back: Normal range of motion.   Skin:     General: Skin is warm and dry.   Neurological:      General: No focal deficit present.      Mental Status: She is alert and oriented to person, place, and time.   Psychiatric:         Mood and Affect: Mood normal.         Behavior: Behavior normal.           Hospital problem list:    Pregnancy    37 weeks gestation of pregnancy    Echogenic intracardiac focus of fetus on prenatal ultrasound    Maternal anemia in pregnancy, antepartum    Moderate persistent asthma with exacerbation    IUGR (intrauterine growth restriction) affecting care of mother, third trimester, fetus 1      Assessment & Plan     1. Intrauterine pregnancy at 37w0d presents with fetal growth restriction with abnormal Dopplers  -Reviewed procedure with patient.  I discussed the risks including but not limited to bleeding, infection and damage to  internal organs.  Understanding of the procedure is voiced.   -GBS unk  -Cat 1  -Cervadil in place    2. Asthma - well-controlled at the moment, s/p steroid taper, duonebs prn    Dispo: admit for IOL      Dora Batres MD  10/14/24  08:39 EDT      Patient Care Team:  Provider, No Known as PCP - Isabell Basilio, RN as Nurse Navigator (Obstetrics)  Eyal Jamison RN as Nurse Navigator (Obstetrics)

## 2024-10-14 NOTE — PLAN OF CARE
Problem: Adult Inpatient Plan of Care  Goal: Plan of Care Review  Outcome: Progressing  Flowsheets (Taken 10/14/2024 0617)  Progress: improving  Outcome Evaluation: Patient receiving cervical ripening for IOL due to IUGR. Patient states she is feeling cramping but that it is. Patient denies bleeding and LOF. States +FM.  Plan of Care Reviewed With: patient  Goal: Patient-Specific Goal (Individualized)  Outcome: Progressing  Goal: Absence of Hospital-Acquired Illness or Injury  Outcome: Progressing  Intervention: Identify and Manage Fall Risk  Recent Flowsheet Documentation  Taken 10/14/2024 0215 by Simon Castillo, RN  Safety Promotion/Fall Prevention: safety round/check completed  Goal: Optimal Comfort and Wellbeing  Outcome: Progressing  Goal: Readiness for Transition of Care  Outcome: Progressing  Intervention: Mutually Develop Transition Plan  Recent Flowsheet Documentation  Taken 10/14/2024 0102 by Simon Castillo, RN  Equipment Currently Used at Home: none     Problem: Labor  Goal: Hemostasis  Outcome: Progressing  Goal: Stable Fetal Wellbeing  Outcome: Progressing  Goal: Effective Progression to Delivery  Outcome: Progressing  Goal: Absence of Infection Signs and Symptoms  Outcome: Progressing  Goal: Acceptable Pain Control  Outcome: Progressing  Goal: Normal Uterine Contraction Pattern  Outcome: Progressing     Problem: Pain Acute  Goal: Optimal Pain Control and Function  Outcome: Progressing     Problem: Fall Injury Risk  Goal: Absence of Fall and Fall-Related Injury  Outcome: Progressing  Intervention: Promote Injury-Free Environment  Recent Flowsheet Documentation  Taken 10/14/2024 0215 by Simon Castillo, RN  Safety Promotion/Fall Prevention: safety round/check completed   Goal Outcome Evaluation:  Plan of Care Reviewed With: patient        Progress: improving  Outcome Evaluation: Patient receiving cervical ripening for IOL due to IUGR. Patient states she is feeling cramping but that it is.  Patient denies bleeding and LOF. States +FM.

## 2024-10-14 NOTE — ANESTHESIA PROCEDURE NOTES
Labor Epidural      Patient reassessed immediately prior to procedure    Patient location during procedure: OB  Performed By  Anesthesiologist: Aram Johnson MD  Preanesthetic Checklist  Completed: patient identified, IV checked, site marked, risks and benefits discussed, surgical consent, monitors and equipment checked, pre-op evaluation and timeout performed  Prep:  Pt Position:sitting  Sterile Tech:gloves, cap, sterile barrier and mask  Prep:chlorhexidine gluconate and isopropyl alcohol  Monitoring:continuous pulse oximetry, EKG and blood pressure monitoring  Epidural Block Procedure:  Approach:midline  Guidance:palpation technique  Location:L3-L4  Needle Type:Tuohy  Needle Gauge:17 G  Loss of Resistance Medium: saline  Loss of Resistance: 7cm  Cath Depth at skin:11 cm  Paresthesia: none  Aspiration:negative  Test Dose:negative  Number of Attempts: 1  Post Assessment:  Dressing:secured with tape and occlusive dressing applied  Pt Tolerance:patient tolerated the procedure well with no apparent complications  Complications:no

## 2024-10-14 NOTE — PLAN OF CARE
Goal Outcome Evaluation:           Progress: improving  Outcome Evaluation: VSS. Pt pain controlled with epidural. Pt recovered from asthma attack. Pt breathing comfortably after breathing treatment. Low BS treated per protocol. Pt plans for vaginal delivery.

## 2024-10-15 ENCOUNTER — PATIENT OUTREACH (OUTPATIENT)
Dept: LABOR AND DELIVERY | Facility: HOSPITAL | Age: 19
End: 2024-10-15
Payer: COMMERCIAL

## 2024-10-15 LAB
ATMOSPHERIC PRESS: 751.2 MMHG
ATMOSPHERIC PRESS: 751.8 MMHG
BASE EXCESS BLDCOA CALC-SCNC: -2.3 MMOL/L (ref -2–2)
BASE EXCESS BLDCOV CALC-SCNC: -1.3 MMOL/L (ref -30–30)
BDY SITE: ABNORMAL
BDY SITE: NORMAL
CO2 BLDA-SCNC: 25.7 MMOL/L (ref 23–27)
CO2 BLDA-SCNC: 27.2 MMOL/L (ref 23–27)
HCO3 BLDCOA-SCNC: 25.5 MMOL/L (ref 22–28)
HCO3 BLDCOV-SCNC: 24.4 MMOL/L
MODALITY: ABNORMAL
MODALITY: NORMAL
PCO2 BLDCOA: 55.5 MMHG (ref 43–63)
PCO2 BLDCOV: 43.8 MM HG (ref 35–51.3)
PH BLDCOA: 7.27 PH UNITS (ref 7.18–7.34)
PH BLDCOV: 7.35 PH UNITS (ref 7.26–7.4)
PO2 BLDCOA: <22.1 MMHG (ref 12–26)
PO2 BLDCOV: 20.8 MM HG (ref 19–39)
QT INTERVAL: 338 MS
QTC INTERVAL: 466 MS
SAO2 % BLDCOA: 29.1 %
SAO2 % BLDCOV: NORMAL %

## 2024-10-15 PROCEDURE — 59400 OBSTETRICAL CARE: CPT | Performed by: STUDENT IN AN ORGANIZED HEALTH CARE EDUCATION/TRAINING PROGRAM

## 2024-10-15 PROCEDURE — 3E033VJ INTRODUCTION OF OTHER HORMONE INTO PERIPHERAL VEIN, PERCUTANEOUS APPROACH: ICD-10-PCS | Performed by: STUDENT IN AN ORGANIZED HEALTH CARE EDUCATION/TRAINING PROGRAM

## 2024-10-15 PROCEDURE — 82803 BLOOD GASES ANY COMBINATION: CPT | Performed by: STUDENT IN AN ORGANIZED HEALTH CARE EDUCATION/TRAINING PROGRAM

## 2024-10-15 PROCEDURE — 94799 UNLISTED PULMONARY SVC/PX: CPT

## 2024-10-15 PROCEDURE — 94761 N-INVAS EAR/PLS OXIMETRY MLT: CPT

## 2024-10-15 PROCEDURE — 94664 DEMO&/EVAL PT USE INHALER: CPT

## 2024-10-15 PROCEDURE — 0UQMXZZ REPAIR VULVA, EXTERNAL APPROACH: ICD-10-PCS | Performed by: STUDENT IN AN ORGANIZED HEALTH CARE EDUCATION/TRAINING PROGRAM

## 2024-10-15 PROCEDURE — 0KQM0ZZ REPAIR PERINEUM MUSCLE, OPEN APPROACH: ICD-10-PCS | Performed by: STUDENT IN AN ORGANIZED HEALTH CARE EDUCATION/TRAINING PROGRAM

## 2024-10-15 PROCEDURE — 88307 TISSUE EXAM BY PATHOLOGIST: CPT

## 2024-10-15 PROCEDURE — 94640 AIRWAY INHALATION TREATMENT: CPT

## 2024-10-15 PROCEDURE — 10907ZC DRAINAGE OF AMNIOTIC FLUID, THERAPEUTIC FROM PRODUCTS OF CONCEPTION, VIA NATURAL OR ARTIFICIAL OPENING: ICD-10-PCS | Performed by: STUDENT IN AN ORGANIZED HEALTH CARE EDUCATION/TRAINING PROGRAM

## 2024-10-15 RX ORDER — DOCUSATE SODIUM 100 MG/1
100 CAPSULE, LIQUID FILLED ORAL 2 TIMES DAILY
Status: DISCONTINUED | OUTPATIENT
Start: 2024-10-15 | End: 2024-10-17 | Stop reason: HOSPADM

## 2024-10-15 RX ORDER — ACETAMINOPHEN 325 MG/1
650 TABLET ORAL EVERY 6 HOURS PRN
Status: DISCONTINUED | OUTPATIENT
Start: 2024-10-15 | End: 2024-10-17 | Stop reason: HOSPADM

## 2024-10-15 RX ORDER — IBUPROFEN 600 MG/1
600 TABLET, FILM COATED ORAL EVERY 6 HOURS PRN
Status: DISCONTINUED | OUTPATIENT
Start: 2024-10-15 | End: 2024-10-17 | Stop reason: HOSPADM

## 2024-10-15 RX ORDER — PROMETHAZINE HYDROCHLORIDE 12.5 MG/1
12.5 TABLET ORAL EVERY 4 HOURS PRN
Status: DISCONTINUED | OUTPATIENT
Start: 2024-10-15 | End: 2024-10-17 | Stop reason: HOSPADM

## 2024-10-15 RX ORDER — MISOPROSTOL 200 UG/1
600 TABLET ORAL ONCE AS NEEDED
Status: DISCONTINUED | OUTPATIENT
Start: 2024-10-15 | End: 2024-10-17 | Stop reason: HOSPADM

## 2024-10-15 RX ORDER — ONDANSETRON 2 MG/ML
4 INJECTION INTRAMUSCULAR; INTRAVENOUS EVERY 6 HOURS PRN
Status: DISCONTINUED | OUTPATIENT
Start: 2024-10-15 | End: 2024-10-17 | Stop reason: HOSPADM

## 2024-10-15 RX ORDER — DIPHENHYDRAMINE HCL 25 MG
50 CAPSULE ORAL NIGHTLY PRN
Status: DISCONTINUED | OUTPATIENT
Start: 2024-10-15 | End: 2024-10-17 | Stop reason: HOSPADM

## 2024-10-15 RX ORDER — ONDANSETRON 4 MG/1
4 TABLET, ORALLY DISINTEGRATING ORAL EVERY 8 HOURS PRN
Status: DISCONTINUED | OUTPATIENT
Start: 2024-10-15 | End: 2024-10-17 | Stop reason: HOSPADM

## 2024-10-15 RX ORDER — SODIUM CHLORIDE 0.9 % (FLUSH) 0.9 %
1-10 SYRINGE (ML) INJECTION AS NEEDED
Status: DISCONTINUED | OUTPATIENT
Start: 2024-10-15 | End: 2024-10-17 | Stop reason: HOSPADM

## 2024-10-15 RX ORDER — OXYTOCIN/0.9 % SODIUM CHLORIDE 30/500 ML
125 PLASTIC BAG, INJECTION (ML) INTRAVENOUS ONCE AS NEEDED
Status: COMPLETED | OUTPATIENT
Start: 2024-10-15 | End: 2024-10-15

## 2024-10-15 RX ORDER — PROMETHAZINE HYDROCHLORIDE 25 MG/1
25 TABLET ORAL EVERY 6 HOURS PRN
Status: DISCONTINUED | OUTPATIENT
Start: 2024-10-15 | End: 2024-10-17 | Stop reason: HOSPADM

## 2024-10-15 RX ORDER — PROMETHAZINE HYDROCHLORIDE 12.5 MG/1
12.5 SUPPOSITORY RECTAL EVERY 6 HOURS PRN
Status: DISCONTINUED | OUTPATIENT
Start: 2024-10-15 | End: 2024-10-17 | Stop reason: HOSPADM

## 2024-10-15 RX ORDER — MISOPROSTOL 200 UG/1
400 TABLET ORAL ONCE
Status: COMPLETED | OUTPATIENT
Start: 2024-10-15 | End: 2024-10-15

## 2024-10-15 RX ORDER — CARBOPROST TROMETHAMINE 250 UG/ML
250 INJECTION, SOLUTION INTRAMUSCULAR ONCE AS NEEDED
Status: DISCONTINUED | OUTPATIENT
Start: 2024-10-15 | End: 2024-10-17 | Stop reason: HOSPADM

## 2024-10-15 RX ORDER — HYDROCODONE BITARTRATE AND ACETAMINOPHEN 10; 325 MG/1; MG/1
1 TABLET ORAL EVERY 4 HOURS PRN
Status: DISCONTINUED | OUTPATIENT
Start: 2024-10-15 | End: 2024-10-17 | Stop reason: HOSPADM

## 2024-10-15 RX ORDER — HYDROCORTISONE 25 MG/G
CREAM TOPICAL AS NEEDED
Status: DISCONTINUED | OUTPATIENT
Start: 2024-10-15 | End: 2024-10-17 | Stop reason: HOSPADM

## 2024-10-15 RX ORDER — HYDROCODONE BITARTRATE AND ACETAMINOPHEN 5; 325 MG/1; MG/1
1 TABLET ORAL EVERY 4 HOURS PRN
Status: DISCONTINUED | OUTPATIENT
Start: 2024-10-15 | End: 2024-10-17 | Stop reason: HOSPADM

## 2024-10-15 RX ORDER — BISACODYL 10 MG
10 SUPPOSITORY, RECTAL RECTAL DAILY PRN
Status: DISCONTINUED | OUTPATIENT
Start: 2024-10-16 | End: 2024-10-17 | Stop reason: HOSPADM

## 2024-10-15 RX ADMIN — IBUPROFEN 600 MG: 600 TABLET ORAL at 16:01

## 2024-10-15 RX ADMIN — ACETAMINOPHEN 325MG 650 MG: 325 TABLET ORAL at 14:41

## 2024-10-15 RX ADMIN — MISOPROSTOL 400 MCG: 200 TABLET ORAL at 00:24

## 2024-10-15 RX ADMIN — IBUPROFEN 600 MG: 600 TABLET ORAL at 04:58

## 2024-10-15 RX ADMIN — IPRATROPIUM BROMIDE AND ALBUTEROL SULFATE 3 ML: 2.5; .5 SOLUTION RESPIRATORY (INHALATION) at 11:57

## 2024-10-15 RX ADMIN — MISOPROSTOL 400 MCG: 200 TABLET ORAL at 00:29

## 2024-10-15 RX ADMIN — HYDROCODONE BITARTRATE AND ACETAMINOPHEN 1 TABLET: 5; 325 TABLET ORAL at 05:21

## 2024-10-15 RX ADMIN — Medication 125 ML/HR: at 01:29

## 2024-10-15 RX ADMIN — IPRATROPIUM BROMIDE AND ALBUTEROL SULFATE 3 ML: 2.5; .5 SOLUTION RESPIRATORY (INHALATION) at 19:14

## 2024-10-15 RX ADMIN — DOCUSATE SODIUM 100 MG: 100 CAPSULE, LIQUID FILLED ORAL at 20:41

## 2024-10-15 RX ADMIN — IPRATROPIUM BROMIDE AND ALBUTEROL SULFATE 3 ML: 2.5; .5 SOLUTION RESPIRATORY (INHALATION) at 01:06

## 2024-10-15 RX ADMIN — DOCUSATE SODIUM 100 MG: 100 CAPSULE, LIQUID FILLED ORAL at 08:30

## 2024-10-15 RX ADMIN — ACETAMINOPHEN 325MG 650 MG: 325 TABLET ORAL at 20:40

## 2024-10-15 RX ADMIN — IPRATROPIUM BROMIDE AND ALBUTEROL SULFATE 3 ML: 2.5; .5 SOLUTION RESPIRATORY (INHALATION) at 08:03

## 2024-10-15 NOTE — L&D DELIVERY NOTE
[facility]   Vaginal Delivery Note    Patient Name: Maria L Carlisle  : 2005  MRN: [id number]    Date of Delivery: 10/15/2024    Diagnosis     Pre & Post-Delivery:  Intrauterine pregnancy at 37w1d  Labor status:      Pregnancy    37 weeks gestation of pregnancy    Echogenic intracardiac focus of fetus on prenatal ultrasound    Maternal anemia in pregnancy, antepartum    Moderate persistent asthma with exacerbation    IUGR (intrauterine growth restriction) affecting care of mother, third trimester, fetus 1     (normal spontaneous vaginal delivery)             Problem List    Transfer to Postpartum     Review the Delivery Report for details.     Delivery     Delivery: Vaginal, Spontaneous    YOB: 2026   Time of Birth:  Gestational Age 12:14 AM  37w1d     Anesthesia: Epidural    Delivering clinician: Dora MCMANUS'Armand Batres   Forceps?   No   Vacuum? No    Shoulder dystocia present: No        Delivery narrative:  The patient is admitted for IOL for FGR with abnormal dopplers.  She received Cervadil then pitocin was started per protocol.  Amniotomy was performed with clear fluid noted.  The patient entered an active phase of labor and progressed along a normal labor curve to complete dilatation at +2 station.    The fetal tracing remained predominantly category I with brief and limited category II changes.    With 2 sets of pushes, patient delivered a live viable male infant occiput anterior with restitution to occiput right.  The anterior and posterior shoulder delivered without difficulty.  No nuchal cord was identified. The body was delivered atraumatically.  The infant's nose and oropharynx were suctioned and cleared of secretions.  Cord clamping was delayed a minimum of 30 seconds then was clamped and cut.  The infant was placed on the mom's chest for bonding and care.    The placenta was expressed and delivered intact.  Second degree and right periurethral laceration were noted and  repaired in standard fashion.  There was oozing at the perineal laceration after application of a figure-of-eight suture so vaginal packing and toro catheter were placed.  Cytotec was administered.  Excellent hemostasis was then achieved.        Infant     Findings: male infant     Infant observations: Weight: No birth weight on file.  Length:   in  Observations/Comments:  LDR 1 PANDA     Apgars: 8  @ 1 minute /    9  @ 5 minutes   Infant Name: Ventura     Placenta & Cord         Placenta delivered  Spontaneous at   10/15/2024 12:16 AM    Cord: 3 vessels present.   Nuchal Cord?  no   Cord blood obtained: Yes   Cord gases obtained:  Yes   Cord gas results: Venous:    pH, Cord Venous   Date Value Ref Range Status   10/15/2024 7.354 7.260 - 7.400 pH Units Final     Comment:     Serial Number: [id number]:  [id number]     Base Excess, Cord Venous   Date Value Ref Range Status   10/15/2024 -1.3 -30.0 - 30.0 mmol/L Final       Arterial:    pH, Cord Arterial   Date Value Ref Range Status   10/15/2024 7.27 7.18 - 7.34 pH Units Final     Comment:     Serial Number: [id number]:  [id number]     Base Exc, Cord Arterial   Date Value Ref Range Status   10/15/2024 -2.3 (L) -2.0 - 2.0 mmol/L Final        Repair     Episiotomy: None    No    Lacerations: Yes  Laceration Information  Laceration Repaired?   Perineal: 2nd     Periurethral: left Yes   Labial:       Sulcus:       Vaginal:       Cervical:         Suture used for repair: 3-0 Vicryl  Laceration Length for 3rd or 4th degree lacerations: n/a   Estimated Blood Loss:       Quantitative Blood Loss:          Complications     none    Disposition     Mother to Mother Baby/Postpartum  in stable condition currently.  Baby to remains with mom  in stable condition currently.    Dora Batres MD  10/15/24  00:50 EDT

## 2024-10-15 NOTE — OUTREACH NOTE
[facility]  IP Postpartum    Questions/Answers      Flowsheet Row Responses   Best Method for Contacting Cell   Support Person Present Yes   Does the patient have a car seat at the hospital Yes   Delivery Note Reviewed Reviewed   Were birth expectations met? Yes   Is there a need for additional support/resources? No   Lactation Note Reviewed Reviewed   Is additional support needed? No   Any questions or concerns? No   Any concerns related discharge meds/ability to  prescriptions? No   Confirm Postpartum OB appointment --  [reviewed]   Confirm initial well-child Pediatrician appointment date/time: --  [reviewed]   Does patient have transportation to appointments? Yes   Any other assistance needed to ensure she is able to attend appointments? No   Does patient have supplies needed at home for  care? Breast Pump, Clothing, Crib, Diapers, Formula          Visited pt at the bedside, she is doing well. She is breastfeeding and supplementing. I will ask our [facility] rep to see her tomorrow. She has all necessary infant supplies. Reviewed f/u appts and I will call her in one week.     Isabell Meyer RN  Maternity Nurse Navigator    10/15/2024, 10:43 EDT

## 2024-10-15 NOTE — ANESTHESIA POSTPROCEDURE EVALUATION
Patient: Maria L Carlisle    Procedure Summary       Date: 10/14/24 Room / Location:     Anesthesia Start: 1510 Anesthesia Stop: 10/15/24 0014    Procedure: LABOR ANALGESIA Diagnosis:     Scheduled Providers:  Provider: Aram Johnson MD    Anesthesia Type: epidural ASA Status: 2            Anesthesia Type: epidural    Vitals  Vitals Value Taken Time   /63 10/15/24 0457   Temp 37.3 °C (99.1 °F) 10/15/24 0350   Pulse 107 10/15/24 0457   Resp 20 10/15/24 0350   SpO2 99 % 10/15/24 0106           Anesthesia Post Evaluation

## 2024-10-15 NOTE — PLAN OF CARE
Problem: Skin Injury Risk Increased  Goal: Skin Health and Integrity  Outcome: Progressing  Intervention: Optimize Skin Protection  Recent Flowsheet Documentation  Taken 10/15/2024 0000 by Alicia Mcduffie RN  Activity Management: bedrest  Taken 10/14/2024 1915 by Alicia Mcduffie RN  Activity Management: bedrest     Problem: Anesthesia/Analgesia, Neuraxial  Goal: Safe, Effective Infusion Delivery  Outcome: Progressing  Goal: Stable Patient-Fetal Status  Outcome: Progressing  Goal: Absence of Infection Signs and Symptoms  Outcome: Progressing  Goal: Nausea and Vomiting Relief  Outcome: Progressing  Goal: Optimal Pain Control and Function  Outcome: Progressing  Goal: Effective Oxygenation and Ventilation  Outcome: Progressing  Goal: Baseline Motor Function Return  Outcome: Progressing  Intervention: Optimize Sensorimotor Function and Safety  Recent Flowsheet Documentation  Taken 10/14/2024 1915 by Alicia Mcduffie RN  Safety Promotion/Fall Prevention: safety round/check completed  Goal: Effective Urinary Elimination  Outcome: Progressing     Problem: Labor  Goal: Hemostasis  Outcome: Progressing  Goal: Stable Fetal Wellbeing  Outcome: Progressing  Goal: Effective Progression to Delivery  Outcome: Progressing  Goal: Absence of Infection Signs and Symptoms  Outcome: Progressing  Goal: Acceptable Pain Control  Outcome: Progressing  Goal: Normal Uterine Contraction Pattern  Outcome: Progressing   Goal Outcome Evaluation:      Pt delivered on 10/15 at 0014. Apgars were 8 and 9. Blood loss was 683 mL. Cytotec 800 was given. 2nd degree lacration with a left periurethral tear. OK to continue plan of care

## 2024-10-15 NOTE — PAYOR COMM NOTE
"Scott Ioana CINDI (18 y.o. Female)       Date of Birth   2005    Social Security Number       Address   [address] [place] [id number]    Home Phone   [phone]    MRN   [id number]       Worship   Confucianist    Marital Status   Single                            Admission Date   10/14/24    Admission Type   Elective    Admitting Provider   Ivania Su MD    Attending Provider   Nan Wheat MD    Department, Room/Bed   [address], E354/1       Discharge Date       Discharge Disposition       Discharge Destination                                 Attending Provider: Nan Wheat MD    Allergies: Pumpkin Seed Oil, Peanut-containing Drug Products    Isolation: None   Infection: None   Code Status: CPR    Ht: 153 cm (60.25\")   Wt: 75.3 kg (166 lb)    Admission Cmt: None   Principal Problem: Pregnancy [Z34.90]                   Active Insurance as of 10/14/2024       Primary Coverage       Payor Plan Insurance Group Employer/Plan Group    [employer] [place] [employer] [place]        Payor Plan Address Payor Plan Phone Number Payor Plan Fax Number Effective Dates    [address] [address] [phone]  2024 - None Entered    [facility] [id number]         Subscriber Name Subscriber Birth Date Member ID       SCOTTIOANA J [id number]                     Emergency Contacts        (Rel.) Home Phone Work Phone Mobile Phone    FANNY CERDA (Mother) -- -- [phone]              Insurance Information                  [facility]/[employer]LL[employer] MEDICAID Phone: [phone]    Subscriber: Ioana Carlisle Subscriber#: [id number]    Group#: -- Precert#: --    Authorization#: -- Effective Date: --          Problem List             Codes Noted - Resolved       Hospital     (normal spontaneous vaginal delivery) ICD-10-CM: O80  ICD-9-CM: 650 10/15/2024 - Present    * (Principal) Pregnancy ICD-10-CM: Z34.90  ICD-9-CM: V22.2 10/14/2024 - Present "    IUGR (intrauterine growth restriction) affecting care of mother, third trimester, fetus 1 ICD-10-CM: O36.5931  ICD-9-CM: 656.53 10/10/2024 - Present    Moderate persistent asthma with exacerbation ICD-10-CM: J45.41  ICD-9-CM: 493.92 2024 - Present    Maternal anemia in pregnancy, antepartum ICD-10-CM: O99.019  ICD-9-CM: 648.23, 285.9 2024 - Present    Echogenic intracardiac focus of fetus on prenatal ultrasound ICD-10-CM: O28.3  ICD-9-CM: 796.5 7/3/2024 - Present    37 weeks gestation of pregnancy ICD-10-CM: Z3A.37  ICD-9-CM: V22.2 2024 - Present        History & Physical        Dora Batres MD at 10/14/24 0837          Psychiatric   Obstetrics and Gynecology   History & Physical    10/14/2024    Patient: Maria L Carlisle          MR#:[id number]    Chief complaint: Fetal growth restriction with intermittently elevated Dopplers    Subjective    18 y.o. female  at 37w0d  with fetal growth depression with intermittently elevated Dopplers presents for induction of labor.  She is doing well.  She received Cervidil overnight and is feeling regular contractions.  Denies loss of fluid and vaginal bleeding.  Endorses regular fetal movements.  EFM has been category 1.    Pregnancy is otherwise complicated by an echogenic intracardiac focus.  Cell free DNA is low risk and other anatomy is normal.      She has a history of asthma.  She developed pneumonia earlier this pregnancy and was hospitalized for worsening respiratory symptoms.  In the early third trimester, pulmonologist recommended a prednisone taper and continued budesonide nebulizer twice daily.  With Symbicort as a rescue device throughout the day.      Patient Active Problem List   Diagnosis    37 weeks gestation of pregnancy    Echogenic intracardiac focus of fetus on prenatal ultrasound    Maternal anemia in pregnancy, antepartum    Moderate persistent asthma with exacerbation    IUGR (intrauterine growth restriction)  affecting care of mother, third trimester, fetus 1    Pregnancy       Past Medical History:   Diagnosis Date    Asthma        History reviewed. No pertinent surgical history.    Obstetric History:  OB History          1    Para        Term                AB        Living             SAB        IAB        Ectopic        Molar        Multiple        Live Births                   Menstrual History:     Patient's last menstrual period was 2024.       # 1 - Date: None, Sex: None, Weight: None, GA: None, Type: None, Apgar1: None, Apgar5: None, Living: None, Birth Comments: None      Prenatal Information:  Prenatal Results       Initial Prenatal Labs       Test Value Reference Range Date Time    Hemoglobin ^ 12.3 g/dL 12.0 - 16.0 24    Hematocrit ^ 36.9 % 36.0 - 46.0 24    Platelets ^ 264 10*3/uL 140 - 440 24    Rubella IgG ^ Immune   24     Hepatitis B SAg ^ Negative   24     Hepatitis C Ab ^ nonreactive   24     RPR  Non Reactive  Non Reactive 24 1008    T. Pallidum Ab   Non-Reactive  Non-Reactive 10/14/24 0053      ^ nonreactive   24     ABO  O   10/14/24 0053    Rh  Positive   10/14/24 0053    Antibody Screen ^ Normal  Normal 24     HIV ^ nonreactive   24     Urine Culture  No growth   24 2146       Final report   24 1558    Gonorrhea  Negative  Negative 24 1600    Chlamydia  Negative  Negative 24 1600    TSH        HgB A1c         Varicella IgG        Hemoglobinopathy Fractionation        Hemoglobinopathy (genetic testing) ^ Normal   24     Cystic fibrosis                   Fetal testing        Test Value Reference Range Date Time    NIPT        MSAFP        AFP-4                  2nd and 3rd Trimester       Test Value Reference Range Date Time    Hemoglobin (repeated)  12.4 g/dL 12.0 - 15.9 10/14/24 0053      ^ 10.0 g/dL 12.0 - 16.0 24 0526      ^ 11.3 g/dL 12.0 - 16.0 24 0437       ^ 12.0 g/dL 12.0 - 16.0 08/21/24 1603       11.7 g/dL 11.1 - 15.9 08/01/24 1008    Hematocrit (repeated)  38.0 % 34.0 - 46.6 10/14/24 0053      ^ 29.8 % 36.0 - 46.0 08/23/24 0526      ^ 33.4 % 36.0 - 46.0 08/22/24 0437      ^ 36.1 % 36.0 - 46.0 08/21/24 1603       36.6 % 34.0 - 46.6 08/01/24 1008    Platelets   233 10*3/mm3 140 - 450 10/14/24 0053      ^ 194 10*3/uL 140 - 440 08/23/24 0526      ^ 205 10*3/uL 140 - 440 08/22/24 0437      ^ 226 10*3/uL 140 - 440 08/21/24 1603       246 x10E3/uL 150 - 450 08/01/24 1008      ^ 264 10*3/uL 140 - 440 05/31/24 2010    1 hour GTT   89 mg/dL 70 - 139 08/01/24 1008    Antibody Screen (repeated)  Negative   10/14/24 0053    3rd TM syphilis scrn (repeated)  RPR   Non Reactive  Non Reactive 08/01/24 1008    3rd TM syphilis scrn (repeated) TP-Ab  Non-Reactive  Non-Reactive 10/14/24 0053    3rd TM syphilis screen TB-Ab (FTA)  Non-Reactive  Non-Reactive 10/14/24 0053    Syphilis cascade test TP-Ab (EIA)        Syphilis cascade TPPA        GTT Fasting        GTT 1 Hr        GTT 2 Hr        GTT 3 Hr        Group B Strep                  Other testing        Test Value Reference Range Date Time    Parvo IgG   0.2 index 0.0 - 0.8 10/03/24 1012    CMV IgG   >10.00 U/mL 0.00 - 0.59 10/03/24 1012              Drug Screening       Test Value Reference Range Date Time    Amphetamine Screen        Barbiturate Screen        Benzodiazepine Screen        Methadone Screen        Phencyclidine Screen        Opiates Screen        THC Screen        Cocaine Screen        Propoxyphene Screen        Buprenorphine Screen        Methamphetamine Screen        Oxycodone Screen        Tricyclic Antidepressants Screen                  Legend    ^: Historical                          External Prenatal Results       Pregnancy Outside Results - Transcribed From Office Records - See Scanned Records For Details       Test Value Date Time    ABO  O  10/14/24 0053    Rh  Positive  10/14/24 0053    Antibody Screen   Negative  10/14/24 0053      ^ Normal  04/02/24     Varicella IgG       Rubella ^ Immune  04/02/24     Hgb  12.4 g/dL 10/14/24 0053      ^ 10.0 g/dL 08/23/24 0526      ^ 11.3 g/dL 08/22/24 0437      ^ 12.0 g/dL 08/21/24 1603       11.7 g/dL 08/01/24 1008      ^ 12.3 g/dL 05/31/24 2010    Hct  38.0 % 10/14/24 0053      ^ 29.8 % 08/23/24 0526      ^ 33.4 % 08/22/24 0437      ^ 36.1 % 08/21/24 1603       36.6 % 08/01/24 1008      ^ 36.9 % 05/31/24 2010    HgB A1c        1h GTT  89 mg/dL 08/01/24 1008    3h GTT Fasting       3h GTT 1 hour       3h GTT 2 hour       3h GTT 3 hour        Gonorrhea (discrete)  Negative  06/05/24 1600    Chlamydia (discrete)  Negative  06/05/24 1600    RPR  Non Reactive  08/01/24 1008    Syphils cascade: TP-Ab (FTA)  Non-Reactive  10/14/24 0053      ^ nonreactive  04/02/24     TP-Ab  Non-Reactive  10/14/24 0053      ^ nonreactive  04/02/24     TP-Ab (EIA)       TPPA       HBsAg ^ Negative  04/02/24     Herpes Simplex Virus PCR       Herpes Simplex VIrus Culture       HIV ^ nonreactive  04/02/24     Hep C RNA Quant PCR       Hep C Antibody ^ nonreactive  04/02/24     AFP       NIPT       Cystic Fibroisis        Group B Strep       GBS Susceptibility to Clindamycin       GBS Susceptibility to Erythromycin       Fetal Fibronectin       Genetic Testing, Maternal Blood                 Drug Screening       Test Value Date Time    Urine Drug Screen       Amphetamine Screen       Barbiturate Screen       Benzodiazepine Screen       Methadone Screen       Phencyclidine Screen       Opiates Screen       THC Screen       Cocaine Screen       Propoxyphene Screen       Buprenorphine Screen       Methamphetamine Screen       Oxycodone Screen       Tricyclic Antidepressants Screen                 Legend    ^: Historical                              Family History   Problem Relation Age of Onset    Other (peripartum cardiomyopathy) Mother        Social History     Tobacco Use    Smoking status: Never      Passive exposure: Never    Smokeless tobacco: Never   Vaping Use    Vaping status: Never Used   Substance Use Topics    Alcohol use: Never    Drug use: Never       Pumpkin seed oil and Peanut-containing drug products      Current Facility-Administered Medications:     acetaminophen (TYLENOL) tablet 650 mg, 650 mg, Oral, Q4H PRN, Ivania Su MD    carboprost (HEMABATE) injection 250 mcg, 250 mcg, Intramuscular, Q15 Min PRN, Ivania Su MD    diphenhydrAMINE (BENADRYL) capsule 25 mg, 25 mg, Oral, Nightly PRN **OR** diphenhydrAMINE (BENADRYL) injection 25 mg, 25 mg, Intravenous, Nightly PRN, Ivania Su MD    diphenhydrAMINE (BENADRYL) injection 12.5 mg, 12.5 mg, Intravenous, Q8H PRN, Matthew Gordon MD    ePHEDrine injection 5 mg, 5 mg, Intravenous, PRN, Matthew Gordon MD    famotidine (PEPCID) injection 20 mg, 20 mg, Intravenous, BID PRN **OR** famotidine (PEPCID) tablet 20 mg, 20 mg, Oral, BID PRN, Ivania Su MD    famotidine (PEPCID) injection 20 mg, 20 mg, Intravenous, Once PRN, Matthew Gordon MD    fentaNYL 2mcg/mL and ropivacaine 0.2% in NS epidural 100mL, 10 mL/hr, Epidural, Continuous, Matthew Gordon MD    fentaNYL citrate (PF) (SUBLIMAZE) injection 50 mcg, 50 mcg, Intravenous, Q1H PRN, Ivania Su MD    lactated ringers infusion, 25 mL/hr, Intravenous, Continuous, Ivania Su MD    lidocaine (XYLOCAINE) 1 % injection 0.5 mL, 0.5 mL, Intradermal, Once PRN, Ivania Su MD    methylergonovine (METHERGINE) injection 200 mcg, 200 mcg, Intramuscular, Once PRN, Ivania Su MD    mineral oil liquid 30 mL, 30 mL, Topical, Once PRN, Ivania Su MD    miSOPROStol (CYTOTEC) tablet 800 mcg, 800 mcg, Rectal, Once PRN, Ivania Su MD    ondansetron ODT (ZOFRAN-ODT) disintegrating tablet 4 mg, 4 mg, Oral, Q6H PRN **OR** ondansetron (ZOFRAN) injection 4 mg, 4 mg, Intravenous, Q6H PRN, Ivania Su MD    ondansetron (ZOFRAN)  injection 4 mg, 4 mg, Intravenous, Once PRN, Matthew Gordon MD    oxytocin (PITOCIN) 30 units in 0.9% sodium chloride 500 mL (premix), 999 mL/hr, Intravenous, Once **FOLLOWED BY** [] oxytocin (PITOCIN) 30 units in 0.9% sodium chloride 500 mL (premix), 250 mL/hr, Intravenous, Continuous, Ivania Su MD    sodium chloride 0.9 % flush 10 mL, 10 mL, Intravenous, Q12H, Ivania Su MD    sodium chloride 0.9 % flush 10 mL, 10 mL, Intravenous, PRN, Ivania Su MD    sodium chloride 0.9 % infusion 40 mL, 40 mL, Intravenous, PRN, Ivania Su MD    terbutaline (BRETHINE) injection 0.25 mg, 0.25 mg, Subcutaneous, PRN, Ivania Su MD    tranexamic acid 1000 mg in 100 mL 0.7% NaCl infusion (premix), 1,000 mg, Intravenous, Once PRN, Ivania Su MD    Review of Systems  Review of Systems   All other systems reviewed and are negative.      Objective    Vital Signs  Temp:  [97.7 °F (36.5 °C)-98.3 °F (36.8 °C)] 97.9 °F (36.6 °C)  Heart Rate:  [91-97] 91  Resp:  [16] 16  BP: ()/(60-79) 121/79    Physical Exam:  Physical Exam  Vitals and nursing note reviewed.   Constitutional:       General: She is not in acute distress.     Appearance: Normal appearance.   HENT:      Head: Normocephalic and atraumatic.   Eyes:      Extraocular Movements: Extraocular movements intact.   Cardiovascular:      Rate and Rhythm: Normal rate.   Pulmonary:      Effort: Pulmonary effort is normal. No respiratory distress.   Abdominal:      General: There is no distension.      Palpations: Abdomen is soft. There is no mass.      Tenderness: There is no abdominal tenderness.   Genitourinary:     Comments: Cervix 2/60/-2  Musculoskeletal:         General: Normal range of motion.      Cervical back: Normal range of motion.   Skin:     General: Skin is warm and dry.   Neurological:      General: No focal deficit present.      Mental Status: She is alert and oriented to person, place, and time.   Psychiatric:          Mood and Affect: Mood normal.         Behavior: Behavior normal.           Hospital problem list:    Pregnancy    37 weeks gestation of pregnancy    Echogenic intracardiac focus of fetus on prenatal ultrasound    Maternal anemia in pregnancy, antepartum    Moderate persistent asthma with exacerbation    IUGR (intrauterine growth restriction) affecting care of mother, third trimester, fetus 1      Assessment & Plan    1. Intrauterine pregnancy at 37w0d presents with fetal growth restriction with abnormal Dopplers  -Reviewed procedure with patient.  I discussed the risks including but not limited to bleeding, infection and damage to internal organs.  Understanding of the procedure is voiced.   -GBS unk  -Cat 1  -Cervadil in place    2. Asthma - well-controlled at the moment, s/p steroid taper, duonebs prn    Dispo: admit for IOL      Dora Batres MD  10/14/24  08:39 EDT      Patient Care Team:  Provider, No Known as PCP - Isabell Basilio, RN as Nurse Navigator (Obstetrics)  Eyal Jamison RN as Nurse Navigator (Obstetrics)            Electronically signed by Dora Batres MD at 10/14/24 1933       Facility-Administered Medications as of 10/15/2024   Medication Dose Route Frequency Provider Last Rate Last Admin    acetaminophen (TYLENOL) tablet 650 mg  650 mg Oral Q6H PRN Dora Batres MD        all purpose nipple ointment   Topical 4x Daily PRN Dora Batres MD        benzocaine (AMERICAINE) 20 % rectal ointment   Rectal PRN Dora Batres MD        benzocaine-menthol (DERMOPLAST) 20-0.5 % topical spray   Topical PRN Dora Batres MD        [START ON 10/16/2024] bisacodyl (DULCOLAX) suppository 10 mg  10 mg Rectal Daily PRN Dora Batres MD        carboprost (HEMABATE) injection 250 mcg  250 mcg Intramuscular Once PRN Dora Batres MD        [COMPLETED] dinoprostone (CERVIDIL) vaginal insert 10 mg  10 mg Vaginal Once Georges,  MD Ivania   10 mg at 10/14/24 0124    diphenhydrAMINE (BENADRYL) capsule 50 mg  50 mg Oral Nightly PRN Dora Batres MD        docusate sodium (COLACE) capsule 100 mg  100 mg Oral BID Dora Batres MD        HYDROcodone-acetaminophen (NORCO) 5-325 MG per tablet 1 tablet  1 tablet Oral Q4H PRN Dora Batres MD   1 tablet at 10/15/24 0521    Or    HYDROcodone-acetaminophen (NORCO)  MG per tablet 1 tablet  1 tablet Oral Q4H PRN Dora Batres MD        Hydrocort-Pramoxine (Perianal) (PROCTOFOAM-HS) 1-1 % rectal foam 1 Application  1 Application Topical PRN Dora Batres MD        Hydrocortisone (Perianal) (ANUSOL-HC) 2.5 % rectal cream   Rectal PRN Dora Batres MD        ibuprofen (ADVIL,MOTRIN) tablet 600 mg  600 mg Oral Q6H PRN Dora Batres MD   600 mg at 10/15/24 0458    [] ipratropium-albuterol (DUO-NEB) 0.5-2.5 mg/3 ml nebulizer solution  - ADS Override Pull             ipratropium-albuterol (DUO-NEB) nebulizer solution 3 mL  3 mL Nebulization Q6H While Awake - RT Dora Batres MD   3 mL at 10/15/24 0106    [] lactated ringers bolus 1,000 mL  1,000 mL Intravenous Once PRN Ivania Su MD        magnesium hydroxide (MILK OF MAGNESIA) suspension 10 mL  10 mL Oral Daily PRN Dora Batres MD        [COMPLETED] miSOPROStol (CYTOTEC) tablet 400 mcg  400 mcg Buccal Once Dora Batres MD   400 mcg at 10/15/24 0029    miSOPROStol (CYTOTEC) tablet 600 mcg  600 mcg Oral Once PRN Dora Batres MD        [COMPLETED] miSOPROStol (CYTOTEC) tablet 800 mcg  800 mcg Rectal Once PRN Ivania Su MD   400 mcg at 10/15/24 0024    ondansetron (ZOFRAN) injection 4 mg  4 mg Intravenous Q6H PRN Dora Batres MD        ondansetron ODT (ZOFRAN-ODT) disintegrating tablet 4 mg  4 mg Oral Q8H PRN Dora Batres MD        [] oxytocin (PITOCIN) 30 units in 0.9% sodium chloride 500 mL (premix)  250 mL/hr  Intravenous Continuous Ivania Su MD        [COMPLETED] oxytocin (PITOCIN) 30 units in 0.9% sodium chloride 500 mL (premix)  125 mL/hr Intravenous Once PRN Dora Batres  mL/hr at 10/15/24 0129 125 mL/hr at 10/15/24 0129    [COMPLETED] penicillin G potassium 5 Million Units in sodium chloride 0.9 % 100 mL MBP  5 Million Units Intravenous Once Dora Batres MD   5 Million Units at 10/14/24 1311    promethazine (PHENERGAN) tablet 25 mg  25 mg Oral Q6H PRN Dora Batres MD        Or    promethazine (PHENERGAN) suppository 12.5 mg  12.5 mg Rectal Q6H PRN Dora Batres MD        promethazine (PHENERGAN) tablet 12.5 mg  12.5 mg Oral Q4H PRN Dora Batres MD        sodium chloride 0.9 % flush 1-10 mL  1-10 mL Intravenous PRN Dora Batres MD         Lab Results (last 72 hours)       Procedure Component Value Units Date/Time    Blood Gas, Venous, Cord [[id number]] Collected: 10/15/24 0028    Specimen: Cord Blood Venous from Umbilical Cord Updated: 10/15/24 0030     Site --     Comment: N/A        pH, Cord Venous 7.354 pH Units      Comment: Serial Number: [id number]:  [id number]        pCO2, Cord Venous 43.8 mm Hg      pO2, Cord Venous 20.8 mm Hg      HCO3, Cord Venous 24.4 mmol/L      Base Excess, Cord Venous -1.3 mmol/L      O2 Sat, Cord Venous --     Comment: Direct O2 saturation result not reported at this site.        CO2 Content 25.7 mmol/L      Barometric Pressure for Blood Gas 751.8000 mmHg      Modality Room Air    Blood Gas, Arterial, Cord [[id number]]  (Abnormal) Collected: 10/15/24 0024    Specimen: Cord Blood Arterial from Umbilical Cord Updated: 10/15/24 0026     Site --     Comment: N/A        pH, Cord Arterial 7.27 pH Units      Comment: Serial Number: [id number]:  [id number]        pCO2, Cord Arterial 55.5 mmHg      pO2, Cord Arterial <22.1 mmHg      HCO3, Cord Arterial 25.5 mmol/L      Base Exc, Cord Arterial -2.3 mmol/L      O2 Sat, Cord Arterial  29.1 %      CO2 Content 27.2 mmol/L      Barometric Pressure for Blood Gas 751.2000 mmHg      Modality Room Air    POC Glucose Once [[id number]]  (Normal) Collected: 10/14/24 2301    Specimen: Blood Updated: 10/14/24 2302     Glucose 86 mg/dL     Glucose, Random [[id number]]  (Normal) Collected: 10/14/24 1817    Specimen: Blood from Arm, Right Updated: 10/14/24 1857     Glucose 91 mg/dL     POC Glucose Once [[id number]]  (Normal) Collected: 10/14/24 1846    Specimen: Blood Updated: 10/14/24 1847     Glucose 105 mg/dL     POC Glucose Once [[id number]]  (Normal) Collected: 10/14/24 1815    Specimen: Blood Updated: 10/14/24 1817     Glucose 73 mg/dL     POC Glucose Once [[id number]]  (Abnormal) Collected: 10/14/24 1759    Specimen: Blood Updated: 10/14/24 1802     Glucose 46 mg/dL     POC Glucose Once [[id number]]  (Abnormal) Collected: 10/14/24 1757    Specimen: Blood Updated: 10/14/24 1802     Glucose 48 mg/dL     POC Glucose Once [[id number]]  (Abnormal) Collected: 10/14/24 1737    Specimen: Blood Updated: 10/14/24 1738     Glucose 59 mg/dL     POC Glucose Once [[id number]]  (Abnormal) Collected: 10/14/24 1720    Specimen: Blood Updated: 10/14/24 1731     Glucose 51 mg/dL     Treponema pallidum AB w/Reflex RPR [[id number]]  (Normal) Collected: 10/14/24 0053    Specimen: Blood Updated: 10/14/24 0146     Treponemal AB Total Non-Reactive    Narrative:      Reactive results will reflex RPR testing.    Comprehensive Metabolic Panel [[id number]]  (Abnormal) Collected: 10/14/24 0053    Specimen: Blood Updated: 10/14/24 0139     Glucose 70 mg/dL      BUN 7 mg/dL      Creatinine 0.60 mg/dL      Sodium 137 mmol/L      Potassium 3.7 mmol/L      Chloride 103 mmol/L      CO2 19.0 mmol/L      Calcium 9.4 mg/dL      Total Protein 6.6 g/dL      Albumin 3.7 g/dL      ALT (SGPT) 16 U/L      AST (SGOT) 23 U/L      Alkaline Phosphatase 129 U/L      Total Bilirubin 0.2 mg/dL      Globulin 2.9 gm/dL      A/G Ratio 1.3 g/dL      BUN/Creatinine  Ratio 11.7     Anion Gap 15.0 mmol/L      eGFR 132.0 mL/min/1.73     Narrative:      GFR Normal >60  Chronic Kidney Disease <60  Kidney Failure <15      CBC & Differential [[id number]]  (Abnormal) Collected: 10/14/24 0053    Specimen: Blood Updated: 10/14/24 0116    Narrative:      The following orders were created for panel order CBC & Differential.  Procedure                               Abnormality         Status                     ---------                               -----------         ------                     CBC Auto Differential[[id number]]        Abnormal            Final result                 Please view results for these tests on the individual orders.    CBC Auto Differential [[id number]]  (Abnormal) Collected: 10/14/24 0053    Specimen: Blood Updated: 10/14/24 0116     WBC 12.65 10*3/mm3      RBC 4.18 10*6/mm3      Hemoglobin 12.4 g/dL      Hematocrit 38.0 %      MCV 90.9 fL      MCH 29.7 pg      MCHC 32.6 g/dL      RDW 13.4 %      RDW-SD 45.2 fl      MPV 10.9 fL      Platelets 233 10*3/mm3      Neutrophil % 67.2 %      Lymphocyte % 23.0 %      Monocyte % 5.1 %      Eosinophil % 3.9 %      Basophil % 0.2 %      Immature Grans % 0.6 %      Neutrophils, Absolute 8.51 10*3/mm3      Lymphocytes, Absolute 2.91 10*3/mm3      Monocytes, Absolute 0.65 10*3/mm3      Eosinophils, Absolute 0.49 10*3/mm3      Basophils, Absolute 0.02 10*3/mm3      Immature Grans, Absolute 0.07 10*3/mm3      nRBC 0.0 /100 WBC           Imaging Results (Last 72 Hours)       ** No results found for the last 72 hours. **          ECG/EMG Results (last 72 hours)       Procedure Component Value Units Date/Time    ECG 12 Lead Other; rapid response tachycardia [[id number]] Collected: 10/14/24 1732     Updated: 10/14/24 1734     QT Interval 338 ms      QTC Interval 466 ms     Narrative:      HEART YCOA=033  bpm  RR Ikmljdgg=728  ms  AZ Yojwdaam=986  ms  P Horizontal Axis=32  deg  P Front Axis=56  deg  QRSD Interval=86  ms  QT  Acefvoeo=682  ms  YXgG=380  ms  QRS Axis=13  deg  T Wave Axis=21  deg  - OTHERWISE NORMAL ECG -  Sinus tachycardia  Ventricular premature complex  Date and Time of Study:2024-10-14 17:32:57          Orders (last 72 hrs)        Start     Ordered    10/16/24 0800  Sitz Bath  3 Times Daily        Comments: PRN    10/15/24 0128    10/16/24 0500  CBC & Differential  Once         10/15/24 0128    10/16/24 0000  bisacodyl (DULCOLAX) suppository 10 mg  Daily PRN         10/15/24 0128    10/15/24 0900  docusate sodium (COLACE) capsule 100 mg  2 Times Daily         10/15/24 0128    10/15/24 0129  Transfer to postpartum when discharge criteria met.  Until Discontinued         10/15/24 0128    10/15/24 0129  Code Status and Medical Interventions: CPR (Attempt to Resuscitate); Full  Continuous         10/15/24 0128    10/15/24 0129  Vital Signs Per hospital policy  Per Hospital Policy         10/15/24 0128    10/15/24 0129  Notify Physician  Until Discontinued         10/15/24 0128    10/15/24 0129  Up Ad Terri  Until Discontinued         10/15/24 0128    10/15/24 0129  Ambulate Patient  Every Shift       10/15/24 0128    10/15/24 0129  Diet: Regular/House; Fluid Consistency: Thin (IDDSI 0)  Diet Effective Now         10/15/24 0128    10/15/24 0129  Advance Diet As Tolerated -Regular  Until Discontinued         10/15/24 0128    10/15/24 0129  Fundal and Lochia Check  Per Hospital Policy        Comments: Q 15 min x 4, Q 30 min x 2, then Q Shift    10/15/24 0128    10/15/24 0129  RN to Assess Rh Status & Place RhIG Evaluation Order if Indicated  Continuous         10/15/24 0128    10/15/24 0129  Bladder Assessment  Per Order Details        Comments: Postpartum 1) Upon Admission to Unit & Every 4 Hours PRN Until Voiding. 2) Out of Bed to Void in 8 Hours.    10/15/24 0128    10/15/24 0129  Straight Cath  Per Order Details        Comments: Postpartum: If Distended & Unable to Void, May Repeat Once.    10/15/24 0128    10/15/24 0129   "Indwelling Urinary Catheter  Per Order Details        Comments: Postpartum : After Straight Cathed x2 or if Greater Than 1000mL Residual, Insert Indwelling Urinary Catheter Until Further MD Order.    10/15/24 0128    10/15/24 0129  Breast pump to bed  Once         10/15/24 0128    10/15/24 0129  If indicated -- Please administer RH Immunoglobulin based on results of cord blood evaluation and fetal screen lab tests, pharmacy to dispense  Per Order Details        Comments: See Process Instructions For Reference Range Details.    10/15/24 0128    10/15/24 0129  Place Sequential Compression Device  Once         10/15/24 0128    10/15/24 0129  Maintain Sequential Compression Device  Continuous         10/15/24 0128    10/15/24 0128  sodium chloride 0.9 % flush 1-10 mL  As Needed         10/15/24 0128    10/15/24 0128  oxytocin (PITOCIN) 30 units in 0.9% sodium chloride 500 mL (premix)  Once As Needed         10/15/24 0128    10/15/24 0128  ibuprofen (ADVIL,MOTRIN) tablet 600 mg  Every 6 Hours PRN         10/15/24 0128    10/15/24 0128  acetaminophen (TYLENOL) tablet 650 mg  Every 6 Hours PRN         10/15/24 0128    10/15/24 0128  HYDROcodone-acetaminophen (NORCO) 5-325 MG per tablet 1 tablet  Every 4 Hours PRN        Placed in \"Or\" Linked Group    10/15/24 0128    10/15/24 0128  HYDROcodone-acetaminophen (NORCO)  MG per tablet 1 tablet  Every 4 Hours PRN        Placed in \"Or\" Linked Group    10/15/24 0128    10/15/24 0128  carboprost (HEMABATE) injection 250 mcg  Once As Needed         10/15/24 0128    10/15/24 0128  miSOPROStol (CYTOTEC) tablet 600 mcg  Once As Needed         10/15/24 0128    10/15/24 0128  diphenhydrAMINE (BENADRYL) capsule 50 mg  Nightly PRN         10/15/24 0128    10/15/24 0128  magnesium hydroxide (MILK OF MAGNESIA) suspension 10 mL  Daily PRN         10/15/24 0128    10/15/24 0128  all purpose nipple ointment  4 Times Daily PRN         10/15/24 0128    10/15/24 0128  benzocaine-menthol " "(DERMOPLAST) 20-0.5 % topical spray  As Needed         10/15/24 0128    10/15/24 0128  Hydrocort-Pramoxine (Perianal) (PROCTOFOAM-HS) 1-1 % rectal foam 1 Application  As Needed         10/15/24 0128    10/15/24 0128  Hydrocortisone (Perianal) (ANUSOL-HC) 2.5 % rectal cream  As Needed         10/15/24 0128    10/15/24 0128  benzocaine (AMERICAINE) 20 % rectal ointment  As Needed         10/15/24 0128    10/15/24 0128  ondansetron ODT (ZOFRAN-ODT) disintegrating tablet 4 mg  Every 8 Hours PRN         10/15/24 0128    10/15/24 0128  ondansetron (ZOFRAN) injection 4 mg  Every 6 Hours PRN         10/15/24 0128    10/15/24 0128  promethazine (PHENERGAN) tablet 12.5 mg  Every 4 Hours PRN         10/15/24 0128    10/15/24 0128  promethazine (PHENERGAN) tablet 25 mg  Every 6 Hours PRN        Placed in \"Or\" Linked Group    10/15/24 0128    10/15/24 0128  promethazine (PHENERGAN) suppository 12.5 mg  Every 6 Hours PRN        Placed in \"Or\" Linked Group    10/15/24 0128    10/15/24 0115  miSOPROStol (CYTOTEC) tablet 400 mcg  Once         10/15/24 0026    10/15/24 0044  Specimen To Pathology  Once,   Status:  Canceled         10/15/24 0043    10/15/24 0044  Transfer Patient  Once         10/15/24 0045    10/15/24 0020  Specimen To Pathology  Once,   Status:  Canceled         10/15/24 0019    10/15/24 0018  Blood Gas, Arterial, Cord  STAT         10/15/24 0017    10/15/24 0018  Blood Gas, Venous, Cord  STAT         10/15/24 0017    10/14/24 2303  POC Glucose Once  PROCEDURE ONCE        Comments: Complete no more than 45 minutes prior to patient eating      10/14/24 2301    10/14/24 2300  ipratropium-albuterol (DUO-NEB) nebulizer solution 3 mL  Every 6 Hours While Awake - RT         10/14/24 1920    10/14/24 2300  POC Glucose Finger Q4H  Every 4 Hours,   Status:  Canceled      Comments: Complete no more than 45 minutes prior to patient eating      10/14/24 2152    10/14/24 2030  ipratropium-albuterol (DUO-NEB) nebulizer solution 3 " "mL  4 Times Daily - RT,   Status:  Discontinued         10/14/24 1706    10/14/24 1848  POC Glucose Once  PROCEDURE ONCE        Comments: Complete no more than 45 minutes prior to patient eating      10/14/24 1846    10/14/24 1818  POC Glucose Once  PROCEDURE ONCE        Comments: Complete no more than 45 minutes prior to patient eating      10/14/24 1815    10/14/24 1808  Glucose, Random  STAT         10/14/24 1808    10/14/24 1803  POC Glucose Once  PROCEDURE ONCE        Comments: Complete no more than 45 minutes prior to patient eating      10/14/24 1757    10/14/24 1803  POC Glucose Once  PROCEDURE ONCE        Comments: Complete no more than 45 minutes prior to patient eating      10/14/24 1759    10/14/24 1800  Hydrocortisone Sod Suc (PF) (Solu-CORTEF) injection 100 mg  Every 8 Hours,   Status:  Discontinued         10/14/24 1712    10/14/24 1739  POC Glucose Once  PROCEDURE ONCE        Comments: Complete no more than 45 minutes prior to patient eating      10/14/24 1737    10/14/24 1732  POC Glucose Once  PROCEDURE ONCE        Comments: Complete no more than 45 minutes prior to patient eating      10/14/24 1720    10/14/24 1714  ECG 12 Lead Other; rapid response tachycardia  STAT         10/14/24 1714    10/14/24 1712  ipratropium-albuterol (DUO-NEB) 0.5-2.5 mg/3 ml nebulizer solution  - ADS Override Pull        Note to Pharmacy: Created by cabinet override    10/14/24 1712    10/14/24 1656  penicillin G in iso-osmotic dextrose IVPB 3 million units (premix)  Every 4 Hours,   Status:  Discontinued        Placed in \"Followed by\" Linked Group    10/14/24 1257    10/14/24 1345  penicillin G potassium 5 Million Units in sodium chloride 0.9 % 100 mL MBP  Once        Placed in \"Followed by\" Linked Group    10/14/24 1257    10/14/24 1330  oxytocin (PITOCIN) 30 units in 0.9% sodium chloride 500 mL (premix)  Titrated,   Status:  Discontinued         10/14/24 1230    10/14/24 0938  Diet: Liquid; Clear Liquid; Fluid " "Consistency: Thin (IDDSI 0)  Diet Effective Now,   Status:  Canceled         10/14/24 0937    10/14/24 0834  Diet: Regular/House; Fluid Consistency: Thin (IDDSI 0)  Diet Effective Now,   Status:  Canceled         10/14/24 0834    10/14/24 0300  fentaNYL 2mcg/mL and ropivacaine 0.2% in NS epidural 100mL  Continuous,   Status:  Discontinued         10/14/24 0201    10/14/24 0202  Vital Signs Per Anesthesia Guidelines  Per Order Details,   Status:  Canceled        Comments: Every 2 Minutes x5, Every 5 Minutes x4, Then If Stable Every 15 Minutes    10/14/24 0201    10/14/24 0202  Start IV with #16 or #18 gauge angiocath.  Once,   Status:  Canceled         10/14/24 0201    10/14/24 0202  Fetal Heart Rate Monitor  Once,   Status:  Canceled         10/14/24 0201    10/14/24 0202  Nurse or anesthesiologist to remain with patient for 15 minutes following dosing.  Until Discontinued,   Status:  Canceled         10/14/24 0201    10/14/24 0202  Facilitate maternal postion on side and maintain uterine displacement.  Until Discontinued,   Status:  Canceled         10/14/24 0201    10/14/24 0202  Consult anesthesia services prior to changing epidural infusion/rate.  Until Discontinued,   Status:  Canceled         10/14/24 0201    10/14/24 0202  Nurse may remove epidural catheter after delivery.  Until Discontinued,   Status:  Canceled         10/14/24 0201    10/14/24 0202  Insert Indwelling Urinary Catheter  Once,   Status:  Canceled        Placed in \"And\" Linked Group    10/14/24 0201    10/14/24 0202  Assess Need for Indwelling Urinary Catheter - Follow Removal Protocol  Continuous,   Status:  Canceled        Comments: Indwelling Urinary Catheter Removal Criteria  Discontinue Indwelling Urinary Catheter Unless One of the Following is Present:  Urinary Retention or Obstruction  Chronic Urinary Catheter Use  End of Life  Critical Illness with Strict I/O   Tract or Abdominal Surgery  Stage 3/4 Sacral / Perineal Wound  Required " "Activity Restriction: Trauma  Required Activity Restriction: Spine Surgery  If Patient is Being Followed by Urology Contact Them PRIOR to Removal  Do Not Remove Indwelling Urinary Catheter Order is Present with a CLINICAL REASON to Maintain the Catheter. Provider is Required to Include a Clinical Reason to Maintain a Urinary Catheter    Patient Admitted With Indwelling Urinary Catheter (Not Placed at [facility])  Assess for Continued Need & Document Medical Necessity  If Infection is Suspected, Contact the Provider       Placed in \"And\" Linked Group    10/14/24 0201    10/14/24 0202  Urinary Catheter Care  Every Shift,   Status:  Canceled      Placed in \"And\" Linked Group    10/14/24 0201    10/14/24 0202  Notify physician for the following conditions:  Until Discontinued,   Status:  Canceled         10/14/24 0201    10/14/24 0201  ePHEDrine injection 5 mg  As Needed,   Status:  Discontinued         10/14/24 0201    10/14/24 0201  ondansetron (ZOFRAN) injection 4 mg  Once As Needed,   Status:  Discontinued         10/14/24 0201    10/14/24 0201  famotidine (PEPCID) injection 20 mg  Once As Needed,   Status:  Discontinued         10/14/24 0201    10/14/24 0201  diphenhydrAMINE (BENADRYL) injection 12.5 mg  Every 8 Hours PRN,   Status:  Discontinued         10/14/24 0201    10/14/24 0145  oxytocin (PITOCIN) 30 units in 0.9% sodium chloride 500 mL (premix)  Continuous        Placed in \"Followed by\" Linked Group    10/14/24 0038    10/14/24 0130  sodium chloride 0.9 % flush 10 mL  Every 12 Hours Scheduled,   Status:  Discontinued         10/14/24 0038    10/14/24 0130  lactated ringers infusion  Continuous,   Status:  Discontinued         10/14/24 0038    10/14/24 0130  oxytocin (PITOCIN) 30 units in 0.9% sodium chloride 500 mL (premix)  Once,   Status:  Discontinued        Placed in \"Followed by\" Linked Group    10/14/24 0038    10/14/24 0130  dinoprostone (CERVIDIL) vaginal insert 10 mg  Once         10/14/24 " "0038    10/14/24 0036  fentaNYL citrate (PF) (SUBLIMAZE) injection 50 mcg  Every 1 Hour PRN,   Status:  Discontinued         10/14/24 0038    10/14/24 0036  diphenhydrAMINE (BENADRYL) capsule 25 mg  Nightly PRN,   Status:  Discontinued        Placed in \"Or\" Linked Group    10/14/24 0038    10/14/24 0036  diphenhydrAMINE (BENADRYL) injection 25 mg  Nightly PRN,   Status:  Discontinued        Placed in \"Or\" Linked Group    10/14/24 0038    10/14/24 0035  Place Sequential Compression Device  Once,   Status:  Canceled         10/14/24 0038    10/14/24 0035  Maintain Sequential Compression Device  Continuous,   Status:  Canceled         10/14/24 0038    10/14/24 0034  Admit To Obstetrics Inpatient  Once         10/14/24 0038    10/14/24 0034  Code Status and Medical Interventions: CPR (Attempt to Resuscitate); Full Support  Continuous,   Status:  Canceled         10/14/24 0038    10/14/24 0034  Obtain Informed Consent  Once,   Status:  Canceled         10/14/24 0038    10/14/24 0034  Vital Signs Per Hospital Policy  Per Hospital Policy,   Status:  Canceled         10/14/24 0038    10/14/24 0034  Mini-Prep Prior to Delivery  Once,   Status:  Canceled         10/14/24 0038    10/14/24 0034  Continuous Fetal Monitoring With NST on Admission and Prior to Initiation of Oxytocin.  Per Order Details,   Status:  Canceled        Comments: Continuous Fetal Monitoring With NST on Admission & Prior to Initiation of Oxytocin.    10/14/24 0038    10/14/24 0034  External Uterine Contraction Monitoring  Per Hospital Policy,   Status:  Canceled         10/14/24 0038    10/14/24 0034  Notify Provider (Specified)  Until Discontinued,   Status:  Canceled         10/14/24 0038    10/14/24 0034  Notify Provider of Tachysystole (Per Hospital Algorithm)  Until Discontinued,   Status:  Canceled         10/14/24 0038    10/14/24 0034  Notify Provider if Membranes Ruptured, Bleeding Greater Than 1 Pad Per Hour, Fetal Heart Tone Abnormality or " Severe Pain  Until Discontinued,   Status:  Canceled         10/14/24 0038    10/14/24 0034  Initiate Group Beta Strep (GBS) Prophylaxis Protocol, If Criteria Met  Continuous,   Status:  Canceled        Comments: NO TREATMENT RECOMMENDED IF: 1) Maternal GBS Status Known Negative 2) Scheduled  Birth With Intact Membranes, Not in Labor 3) Maternal GBS Status Unknown, No Risk Factors  TREAT WITH ANTIBIOTICS IF:  1) Maternal GBS Status Known Positive 2) Maternal GBS Status Unknown With Risk Factors: a)  Previous Infant Affected By GBS Infection b) GBS Urinary Tract Infection (UTI) or Bacteriuria During Pregnancy c) Unexplained Maternal Fever (100.4F (38C) or Greater) During Labor d)  Prolonged Rupture of Membranes (18 or More Hours) e) Gestational Age Less Than 37 Weeks    10/14/24 0038    10/14/24 0034  NPO Diet NPO Type: Ice Chips  Diet Effective Now,   Status:  Canceled         10/14/24 0038    10/14/24 0034  Inpatient Anesthesiology Consult  Once,   Status:  Canceled        Specialty:  Anesthesiology  Provider:  (Not yet assigned)    10/14/24 0038    10/14/24 0034  Treponema pallidum AB w/Reflex RPR  Once         10/14/24 0038    10/14/24 0034  CBC & Differential  Once         10/14/24 0038    10/14/24 0034  Comprehensive Metabolic Panel  Once         10/14/24 0038    10/14/24 0034  Type & Screen  Once         10/14/24 0038    10/14/24 0034  Insert Peripheral IV  Once,   Status:  Canceled         10/14/24 0038    10/14/24 0034  Saline Lock & Maintain IV Access  Continuous,   Status:  Canceled         10/14/24 0038    10/14/24 0034  Notify Provider (Specified)  Until Discontinued,   Status:  Canceled         10/14/24 0038    10/14/24 0034  Vital Signs Per Hospital Policy  Per Hospital Policy,   Status:  Canceled         10/14/24 0038    10/14/24 0034  Fundal & Lochia Check  Per Order Details        Comments: Every 15 Minutes x4, Then Every 30 Minutes x2, Then Every Shift    10/14/24 0038    10/14/24 0034   "Fundal & Lochia Check  Every Shift,   Status:  Canceled       10/14/24 0038    10/14/24 0034  Diet: Regular/House; Fluid Consistency: Thin (IDDSI 0)  Diet Effective Now,   Status:  Canceled         10/14/24 0038    10/14/24 0034  Advance Diet As Tolerated -  Until Discontinued,   Status:  Canceled         10/14/24 0038    10/14/24 0034  CBC Auto Differential  PROCEDURE ONCE         10/14/24 0038    10/14/24 0033  sodium chloride 0.9 % flush 10 mL  As Needed,   Status:  Discontinued         10/14/24 0038    10/14/24 0033  sodium chloride 0.9 % infusion 40 mL  As Needed,   Status:  Discontinued         10/14/24 0038    10/14/24 0033  lidocaine (XYLOCAINE) 1 % injection 0.5 mL  Once As Needed,   Status:  Discontinued         10/14/24 0038    10/14/24 0033  lactated ringers bolus 1,000 mL  Once As Needed         10/14/24 0038    10/14/24 0033  acetaminophen (TYLENOL) tablet 650 mg  Every 4 Hours PRN,   Status:  Discontinued         10/14/24 0038    10/14/24 0033  ondansetron ODT (ZOFRAN-ODT) disintegrating tablet 4 mg  Every 6 Hours PRN,   Status:  Discontinued        Placed in \"Or\" Linked Group    10/14/24 0038    10/14/24 0033  ondansetron (ZOFRAN) injection 4 mg  Every 6 Hours PRN,   Status:  Discontinued        Placed in \"Or\" Linked Group    10/14/24 0038    10/14/24 0033  terbutaline (BRETHINE) injection 0.25 mg  As Needed,   Status:  Discontinued         10/14/24 0038    10/14/24 0033  mineral oil liquid 30 mL  Once As Needed,   Status:  Discontinued         10/14/24 0038    10/14/24 0033  famotidine (PEPCID) injection 20 mg  2 Times Daily PRN,   Status:  Discontinued        Placed in \"Or\" Linked Group    10/14/24 0038    10/14/24 0033  famotidine (PEPCID) tablet 20 mg  2 Times Daily PRN,   Status:  Discontinued        Placed in \"Or\" Linked Group    10/14/24 0038    10/14/24 0033  methylergonovine (METHERGINE) injection 200 mcg  Once As Needed,   Status:  Discontinued         10/14/24 0038    10/14/24 0033  " carboprost (HEMABATE) injection 250 mcg  Every 15 Minutes PRN,   Status:  Discontinued         10/14/24 0038    10/14/24 0033  miSOPROStol (CYTOTEC) tablet 800 mcg  Once As Needed         10/14/24 0038    10/14/24 0033  tranexamic acid 1000 mg in 100 mL 0.7% NaCl infusion (premix)  Once As Needed,   Status:  Discontinued         10/14/24 0038    10/14/24 0033  Position Change - For Intra-Uterine Resusitation for Hypertonus, HyperStimulation or Non-Reassuring Fetal Status  As Needed,   Status:  Canceled       10/14/24 0038    Unscheduled  Apply Ice to Perineum  As Needed      Comments: For 20 min q 2 hrs    10/15/24 0128    Unscheduled  Waffle Cushion  As Needed      Comments: For perineal discomfort    10/15/24 0128    Unscheduled  Donut Ring  As Needed      Comments: For perineal pain    10/15/24 0128    Unscheduled  Kpad  As Needed      Comments: For pain    10/15/24 0128    Unscheduled  Apply witch hazel pads / TUCKS to perineum as needed for comfort PRN  As Needed       10/15/24 0128    Unscheduled  Warm compress  As Needed       10/15/24 0128    Unscheduled  Apply ace wrap, tight bra, or binder  As Needed       10/15/24 0128    Unscheduled  Apply ice packs  As Needed       10/15/24 0128    Pending  Vital Signs Per Anesthesia Guidelines  Per Order Details        Comments: Every 2 Minutes x5, Every 5 Minutes x4, Then If Stable Every 15 Minutes    Pending    Pending  Start IV with #16 or #18 gauge angiocath.  Once         Pending    Pending  Fetal Heart Rate Monitor  Once         Pending    Pending  Nurse or anesthesiologist to remain with patient for 15 minutes following dosing.  Until Discontinued         Pending    Pending  Facilitate maternal postion on side and maintain uterine displacement.  Until Discontinued         Pending    Pending  Consult anesthesia services prior to changing epidural infusion/rate.  Until Discontinued         Pending    Pending  Nurse may remove epidural catheter after delivery.   "Until Discontinued         Pending    Pending  Insert Indwelling Urinary Catheter  Once        Placed in \"And\" Linked Group    Pending    Pending  Assess Need for Indwelling Urinary Catheter - Follow Removal Protocol  Continuous        Comments: Indwelling Urinary Catheter Removal Criteria  Discontinue Indwelling Urinary Catheter Unless One of the Following is Present:  Urinary Retention or Obstruction  Chronic Urinary Catheter Use  End of Life  Critical Illness with Strict I/O   Tract or Abdominal Surgery  Stage 3/4 Sacral / Perineal Wound  Required Activity Restriction: Trauma  Required Activity Restriction: Spine Surgery  If Patient is Being Followed by Urology Contact Them PRIOR to Removal  Do Not Remove Indwelling Urinary Catheter Order is Present with a CLINICAL REASON to Maintain the Catheter. Provider is Required to Include a Clinical Reason to Maintain a Urinary Catheter    Patient Admitted With Indwelling Urinary Catheter (Not Placed at [facility])  Assess for Continued Need & Document Medical Necessity  If Infection is Suspected, Contact the Provider       Placed in \"And\" Linked Group    Pending    Pending  Urinary Catheter Care  Every Shift      Placed in \"And\" Linked Group    Pending    Pending  Notify physician for the following conditions:  Until Discontinued         Pending    Pending  fentaNYL 2mcg/mL and ropivacaine 0.2% in NS epidural 100mL  Continuous         Pending    Pending  ePHEDrine injection 5 mg  Every 15 Minutes PRN         Pending    Pending  ondansetron (ZOFRAN) injection 4 mg  Once As Needed         Pending    Pending  famotidine (PEPCID) injection 20 mg  Once As Needed         Pending    Pending  diphenhydrAMINE (BENADRYL) injection 12.5 mg  Every 8 Hours PRN         Pending    Pending  Transfer to postpartum when discharge criteria met.  Until Discontinued         Pending                     Operative/Procedure Notes (last 72 hours)        Dora Batres MD at " 10/15/24 0045           [facility]   Vaginal Delivery Note    Patient Name: Maria L Carlisle  : 2005  MRN: [id number]    Date of Delivery: 10/15/2024    Diagnosis     Pre & Post-Delivery:  Intrauterine pregnancy at 37w1d  Labor status:      Pregnancy    37 weeks gestation of pregnancy    Echogenic intracardiac focus of fetus on prenatal ultrasound    Maternal anemia in pregnancy, antepartum    Moderate persistent asthma with exacerbation    IUGR (intrauterine growth restriction) affecting care of mother, third trimester, fetus 1     (normal spontaneous vaginal delivery)             Problem List    Transfer to Postpartum     Review the Delivery Report for details.     Delivery     Delivery: Vaginal, Spontaneous    YOB: 2026   Time of Birth:  Gestational Age 12:14 AM  37w1d     Anesthesia: Epidural    Delivering clinician: Dora Batres   Forceps?   No   Vacuum? No    Shoulder dystocia present: No        Delivery narrative:  The patient is admitted for IOL for FGR with abnormal dopplers.  She received Cervadil then pitocin was started per protocol.  Amniotomy was performed with clear fluid noted.  The patient entered an active phase of labor and progressed along a normal labor curve to complete dilatation at +2 station.    The fetal tracing remained predominantly category I with brief and limited category II changes.    With 2 sets of pushes, patient delivered a live viable male infant occiput anterior with restitution to occiput right.  The anterior and posterior shoulder delivered without difficulty.  No nuchal cord was identified. The body was delivered atraumatically.  The infant's nose and oropharynx were suctioned and cleared of secretions.  Cord clamping was delayed a minimum of 30 seconds then was clamped and cut.  The infant was placed on the mom's chest for bonding and care.    The placenta was expressed and delivered intact.  Second degree and right periurethral  laceration were noted and repaired in standard fashion.  There was oozing at the perineal laceration after application of a figure-of-eight suture so vaginal packing and toro catheter were placed.  Cytotec was administered.  Excellent hemostasis was then achieved.        Infant     Findings: male infant     Infant observations: Weight: No birth weight on file.  Length:   in  Observations/Comments:  LDR 1 PANDA     Apgars: 8  @ 1 minute /    9  @ 5 minutes   Infant Name: Ventura     Placenta & Cord         Placenta delivered  Spontaneous at   10/15/2024 12:16 AM    Cord: 3 vessels present.   Nuchal Cord?  no   Cord blood obtained: Yes   Cord gases obtained:  Yes   Cord gas results: Venous:    pH, Cord Venous   Date Value Ref Range Status   10/15/2024 7.354 7.260 - 7.400 pH Units Final     Comment:     Serial Number: [id number]:  [id number]     Base Excess, Cord Venous   Date Value Ref Range Status   10/15/2024 -1.3 -30.0 - 30.0 mmol/L Final       Arterial:    pH, Cord Arterial   Date Value Ref Range Status   10/15/2024 7.27 7.18 - 7.34 pH Units Final     Comment:     Serial Number: [id number]:  [id number]     Base Exc, Cord Arterial   Date Value Ref Range Status   10/15/2024 -2.3 (L) -2.0 - 2.0 mmol/L Final        Repair     Episiotomy: None    No    Lacerations: Yes  Laceration Information  Laceration Repaired?   Perineal: 2nd     Periurethral: left Yes   Labial:       Sulcus:       Vaginal:       Cervical:         Suture used for repair: 3-0 Vicryl  Laceration Length for 3rd or 4th degree lacerations: n/a   Estimated Blood Loss:       Quantitative Blood Loss:          Complications     none    Disposition     Mother to Mother Baby/Postpartum  in stable condition currently.  Baby to remains with mom  in stable condition currently.    Dora Batres MD  10/15/24  00:50 EDT          Electronically signed by Dora Batres MD at 10/15/24 0050          Physician Progress Notes (last 72 hours)        Gisel  Dora Forte MD at 10/14/24 0989          I was called to patient room for evaluation of patient.  RN reported that patient told her chest felt tight and then she stopped responding.  Upon my arrival, patient had her eyes closed and was not responding with voice but was taking deep breaths that appeared strained and nodding in response to my questions.  Blood pressure was normal.  Heart rate was tachycardic.  Pulse ox showed 84%.  Bilateral wheezing was noted upon lung auscultation.  Stat DuoNeb via respiratory therapy was ordered.  Albuterol inhaler at bedside was used to give 2 puffs.  O2 saturation improved to 100% but patient was still taking strange breaths.  Anesthesiologist and rapid response team soon arrived.  IV hydrocortisone was ordered.  Respiratory therapy administered DuoNeb.  Glucose was checked and found to be 51.  Patient was more reliably responding and able to drink apple juice.  She soon was feeling much better.  Vitals were stable and O2 saturation is 100% on room air.  EFM remained category 1 throughout the entire episode.    Patient rested for the next 1.5 hours.  I discussed with her, her mother, and her grandmother what had occurred.  I suspect a combination of asthma attack, hypoglycemia, and anxiety attack resulted in the episode as described above.  Since resolution, she feels great.  Fetal status has remained reassuring.  Discussed that it would be reasonable to restart induction.  She excitedly agreed and requested amniotomy.  Cervix was 4/90/0.  Amniotomy was performed with clear fluid noted.  Pitocin ordered.  Duonebs scheduled q6 x 48 hours.    Dora Batres MD  10/14/24  19:18 EDT      Electronically signed by Dora Batres MD at 10/14/24 1921       Consult Notes (last 72 hours)  Notes from 10/12/24 0700 through 10/15/24 0700   No notes of this type exist for this encounter.

## 2024-10-15 NOTE — LACTATION NOTE
This note was copied from a baby's chart.  P1,37/1. SGA- bgm's 51,52. Mom is bf, using her PBP and supplementing with formula. She reports feeling painless tugging when baby has latched. Reinforced calling before feedings for glucose check and feeding regularly.         Reviewed bf education: ways to wake baby- STS, suck training, stimulation, HE colostrum.  Attempt feeding every 2-3 hours from start of last feeding and when baby is alert, feeding cues present. Call for bgm first until otherwise told.   Normal 37 week behavior including sleepiness- HE or pump and give ebm if no latch achieved.  Proper way to position and steps for an effective latch, break latch if pinching and check nipple shape after feeds.   Weight and output expectations.  Infant stomach size  Full milk supply day 3-5.  Nipple care. Lanolin given with instructions.  Review Postpartum handbook pages 34-45, [facility] information.   Call LC for any assistance. # on WB.   Has PBP.

## 2024-10-15 NOTE — LACTATION NOTE
This note was copied from a baby's chart.  Attempted to help mom with BF, but baby is too sleepy. He has  a significant TT.  Discussed with parents different ways for frenotomy to be performed. Baby is SGA and since hasn't been BF well, HGP initiated at this time with instructions of use, cleaning the parts and milk storage. Encouraged PT to pump every 3h for 15 min.on each breast if baby is not BF. Educated on the importance of stimulation for adequate milk supply. PT denies any questions. Encouraged her to call if needing further help.

## 2024-10-16 ENCOUNTER — APPOINTMENT (OUTPATIENT)
Dept: GENERAL RADIOLOGY | Facility: HOSPITAL | Age: 19
End: 2024-10-16
Payer: COMMERCIAL

## 2024-10-16 LAB
BASOPHILS # BLD AUTO: 0.04 10*3/MM3 (ref 0–0.2)
BASOPHILS NFR BLD AUTO: 0.3 % (ref 0–1.5)
DEPRECATED RDW RBC AUTO: 44 FL (ref 37–54)
EOSINOPHIL # BLD AUTO: 0.45 10*3/MM3 (ref 0–0.4)
EOSINOPHIL NFR BLD AUTO: 3.7 % (ref 0.3–6.2)
ERYTHROCYTE [DISTWIDTH] IN BLOOD BY AUTOMATED COUNT: 13.1 % (ref 12.3–15.4)
HCT VFR BLD AUTO: 28.1 % (ref 34–46.6)
HGB BLD-MCNC: 9.6 G/DL (ref 12–15.9)
IMM GRANULOCYTES # BLD AUTO: 0.09 10*3/MM3 (ref 0–0.05)
IMM GRANULOCYTES NFR BLD AUTO: 0.7 % (ref 0–0.5)
LYMPHOCYTES # BLD AUTO: 3.31 10*3/MM3 (ref 0.7–3.1)
LYMPHOCYTES NFR BLD AUTO: 27.1 % (ref 19.6–45.3)
MCH RBC QN AUTO: 31.6 PG (ref 26.6–33)
MCHC RBC AUTO-ENTMCNC: 34.2 G/DL (ref 31.5–35.7)
MCV RBC AUTO: 92.4 FL (ref 79–97)
MONOCYTES # BLD AUTO: 0.78 10*3/MM3 (ref 0.1–0.9)
MONOCYTES NFR BLD AUTO: 6.4 % (ref 5–12)
NEUTROPHILS NFR BLD AUTO: 61.8 % (ref 42.7–76)
NEUTROPHILS NFR BLD AUTO: 7.53 10*3/MM3 (ref 1.7–7)
NRBC BLD AUTO-RTO: 0 /100 WBC (ref 0–0.2)
PLATELET # BLD AUTO: 183 10*3/MM3 (ref 140–450)
PMV BLD AUTO: 11.1 FL (ref 6–12)
RBC # BLD AUTO: 3.04 10*6/MM3 (ref 3.77–5.28)
WBC NRBC COR # BLD AUTO: 12.2 10*3/MM3 (ref 3.4–10.8)

## 2024-10-16 PROCEDURE — 94761 N-INVAS EAR/PLS OXIMETRY MLT: CPT

## 2024-10-16 PROCEDURE — 85025 COMPLETE CBC W/AUTO DIFF WBC: CPT | Performed by: STUDENT IN AN ORGANIZED HEALTH CARE EDUCATION/TRAINING PROGRAM

## 2024-10-16 PROCEDURE — 94799 UNLISTED PULMONARY SVC/PX: CPT

## 2024-10-16 PROCEDURE — 94664 DEMO&/EVAL PT USE INHALER: CPT

## 2024-10-16 RX ORDER — BUDESONIDE 0.5 MG/2ML
0.5 INHALANT ORAL
Status: DISCONTINUED | OUTPATIENT
Start: 2024-10-16 | End: 2024-10-17 | Stop reason: HOSPADM

## 2024-10-16 RX ORDER — IPRATROPIUM BROMIDE AND ALBUTEROL SULFATE 2.5; .5 MG/3ML; MG/3ML
3 SOLUTION RESPIRATORY (INHALATION)
Status: DISCONTINUED | OUTPATIENT
Start: 2024-10-16 | End: 2024-10-17 | Stop reason: HOSPADM

## 2024-10-16 RX ORDER — ALBUTEROL SULFATE 0.83 MG/ML
2.5 SOLUTION RESPIRATORY (INHALATION) EVERY 6 HOURS PRN
Status: DISCONTINUED | OUTPATIENT
Start: 2024-10-16 | End: 2024-10-17 | Stop reason: HOSPADM

## 2024-10-16 RX ADMIN — IBUPROFEN 600 MG: 600 TABLET ORAL at 00:14

## 2024-10-16 RX ADMIN — IBUPROFEN 600 MG: 600 TABLET ORAL at 21:32

## 2024-10-16 RX ADMIN — DOCUSATE SODIUM 100 MG: 100 CAPSULE, LIQUID FILLED ORAL at 09:39

## 2024-10-16 RX ADMIN — ACETAMINOPHEN 325MG 650 MG: 325 TABLET ORAL at 06:40

## 2024-10-16 RX ADMIN — BUDESONIDE 0.5 MG: 0.5 INHALANT RESPIRATORY (INHALATION) at 21:10

## 2024-10-16 RX ADMIN — DOCUSATE SODIUM 100 MG: 100 CAPSULE, LIQUID FILLED ORAL at 21:32

## 2024-10-16 RX ADMIN — IPRATROPIUM BROMIDE AND ALBUTEROL SULFATE 3 ML: 2.5; .5 SOLUTION RESPIRATORY (INHALATION) at 07:25

## 2024-10-16 RX ADMIN — IPRATROPIUM BROMIDE AND ALBUTEROL SULFATE 3 ML: 2.5; .5 SOLUTION RESPIRATORY (INHALATION) at 21:07

## 2024-10-16 RX ADMIN — IBUPROFEN 600 MG: 600 TABLET ORAL at 14:34

## 2024-10-16 NOTE — NURSING NOTE
VSS  for remainder of shift. Up ad rachelle. Little to no pain reported. No concerns at this time.

## 2024-10-16 NOTE — PLAN OF CARE
VSS. Pain controlled with po meds. Fundus and lochia within normal limits. Ambulating independently. Voiding spontaneously. Breastfeeding and bottle feeding. No concerns at this time

## 2024-10-16 NOTE — PROGRESS NOTES
"[facility]  Vaginal Delivery Progress Note    Subjective   Postpartum Day 1: Vaginal Delivery    The patient feels well.  Her pain is well controlled. She is ambulating well.  Patient describes her bleeding as staining only.    Breastfeeding: yes    ROS:  Neuro: neg for headache  Pulm: neg for soa  CV: neg for chest pain  : neg for heavy bleeding  Musculoskeletal: neg for leg pain    Objective     Vital Signs Range for the last 24 hours  Temperature: Temp:  [98 °F (36.7 °C)-98.8 °F (37.1 °C)] 98 °F (36.7 °C)   Temp Source: Temp src: Oral   BP: BP: (105-115)/(67-71) 115/71   Pulse: Heart Rate:  [81-90] 81   Respirations: Resp:  [16] 16   SPO2: SpO2:  [97 %-98 %] 97 %   O2 Amount (l/min):     O2 Devices Device (Oxygen Therapy): room air   Weight:       Admit Height:  Height: 153 cm (60.25\")      Physical Exam:  General:  no acute distress.  Pulmonary: normal respiratory effort, lungs are clear bilaterally  Abdomen: fundus firm at U-1  Extremities: Bilateral lower ext with trace edema, no cords or tenderness.       Lab results reviewed:    Lab Results   Component Value Date    WBC 12.20 (H) 10/16/2024    HGB 9.6 (L) 10/16/2024    HCT 28.1 (L) 10/16/2024    MCV 92.4 10/16/2024     10/16/2024     Rubella:  record --    Rubella Antibodies, IgG   Date Value Ref Range Status   04/02/2024 Immune  Final     Rh Status:    RH type   Date Value Ref Range Status   10/14/2024 Positive  Final     Immunizations:   Immunization History   Administered Date(s) Administered    31-influenza Vac Quardvalent Preservativ 11/30/2018, 12/11/2020    COVID-19 (PFIZER) Purple Cap Monovalent 05/15/2021, 06/09/2021    Fluzone  >6mos 02/05/2018    Fluzone (or Fluarix & Flulaval for VFC) >6mos 10/02/2015, 09/19/2019, 10/14/2021, 11/21/2022    Hep B, Adolescent or Pediatric 05/14/2020    Hpv9 09/06/2016    Meningococcal MCV4P (Menactra) 11/14/2019    PPD Test 05/13/2024    Tdap 08/01/2024    Trumenba(meningococcal B) 11/14/2019, " 2020       Assessment & Plan       Pregnancy    37 weeks gestation of pregnancy    Echogenic intracardiac focus of fetus on prenatal ultrasound    Maternal anemia in pregnancy, antepartum    Moderate persistent asthma with exacerbation    IUGR (intrauterine growth restriction) affecting care of mother, third trimester, fetus 1     (normal spontaneous vaginal delivery)      Maria L Carlisle is Day 1  post-partum  Vaginal, Spontaneous : pt denies any current issues    Pt requests infant circumcision. Reviewed risks to include bleeding, infection, damage to penis and need for revision. Discussed the elective nature of procedure. Patient notes understanding and requests today.       Plan:  Continue current care.    Addendum at 1752: pt verbally confirmed that her vaginal packing was removed yesterday.     Bre Duvall MD  10/16/2024  13:08 EDT

## 2024-10-16 NOTE — LACTATION NOTE
This note was copied from a baby's chart.  Informed PT that LC is available again today to help with BF. Mother reports baby BF well on the left and she pumps on the right and supplementing with formula. Encouraged PT always to offer breast first and then bottle.  Educated again on the importance of stimulation for adequate milk supply. Mother denies any questions. Encouraged to call if needing help.

## 2024-10-16 NOTE — LACTATION NOTE
This note was copied from a baby's chart.  Baby is more alert, bf better now and reports tugging without pain. She also supplements with Neosure. Parents will address oral restriction with peds in am. She had no concerns at this time. Has a PBP and LC number is on board.

## 2024-10-16 NOTE — PLAN OF CARE
Goal Outcome Evaluation:   AVSS, Assessment WDL. Pt getting up and doing routine care and voiding without difficulty. Pain controled with tylenol or motrin as needed. Breastfeeding and also bottle feeding with neosure well.

## 2024-10-17 VITALS
DIASTOLIC BLOOD PRESSURE: 75 MMHG | HEART RATE: 102 BPM | TEMPERATURE: 98.1 F | WEIGHT: 166 LBS | RESPIRATION RATE: 16 BRPM | HEIGHT: 60 IN | SYSTOLIC BLOOD PRESSURE: 124 MMHG | OXYGEN SATURATION: 100 % | BODY MASS INDEX: 32.59 KG/M2

## 2024-10-17 PROCEDURE — 94664 DEMO&/EVAL PT USE INHALER: CPT

## 2024-10-17 PROCEDURE — 94799 UNLISTED PULMONARY SVC/PX: CPT

## 2024-10-17 PROCEDURE — 0503F POSTPARTUM CARE VISIT: CPT | Performed by: OBSTETRICS & GYNECOLOGY

## 2024-10-17 RX ORDER — IBUPROFEN 600 MG/1
600 TABLET, FILM COATED ORAL EVERY 6 HOURS PRN
Qty: 30 TABLET | Refills: 1 | Status: SHIPPED | OUTPATIENT
Start: 2024-10-17

## 2024-10-17 RX ADMIN — IBUPROFEN 600 MG: 600 TABLET ORAL at 08:35

## 2024-10-17 RX ADMIN — BUDESONIDE 0.5 MG: 0.5 INHALANT RESPIRATORY (INHALATION) at 08:53

## 2024-10-17 RX ADMIN — ACETAMINOPHEN 325MG 650 MG: 325 TABLET ORAL at 05:14

## 2024-10-17 RX ADMIN — IPRATROPIUM BROMIDE AND ALBUTEROL SULFATE 3 ML: 2.5; .5 SOLUTION RESPIRATORY (INHALATION) at 08:53

## 2024-10-17 NOTE — DISCHARGE SUMMARY
[facility]   Obstetrics and Gynecology   Vaginal delivery Discharge Summary      Date of Admission: 10/14/2024    Date of Discharge:  10/17/2024      Patient: Maria L Carlisle      MR#:[id number]    Surgeon/OB: Dora Batres    Discharge Diagnosis:   Vaginal Delivery at 37w1d, uncomplicated recovery     (normal spontaneous vaginal delivery)    37 weeks gestation of pregnancy    Echogenic intracardiac focus of fetus on prenatal ultrasound    Maternal anemia in pregnancy, antepartum    Moderate persistent asthma with exacerbation    IUGR (intrauterine growth restriction) affecting care of mother, third trimester, fetus 1    Pregnancy      Procedures:  Vaginal, Spontaneous    10/15/2024   12:14 AM     Anesthesia:  Epidural    Hospital Course  Patient is a 18 y.o. female  at 37w1d status post vaginal delivery.    Uneventful recovery.  Patient is ambulating, tolerating a regular diet.  Perineum is intact.    Infant:   male fetus 2505 g (5 lb 8.4 oz) with Apgar scores of 8 , 9  at five minutes.    Condition on Discharge:  Stable    Vital Signs  Temp:  [97.8 °F (36.6 °C)-98.6 °F (37 °C)] 98.1 °F (36.7 °C)  Heart Rate:  [102-119] 102  Resp:  [16] 16  BP: (124-142)/(72-79) 124/75    Results from last 7 days   Lab Units 10/16/24  0635 10/14/24  0053   WBC 10*3/mm3 12.20* 12.65*   HEMOGLOBIN g/dL 9.6* 12.4   HEMATOCRIT % 28.1* 38.0   PLATELETS 10*3/mm3 183 233     Results from last 7 days   Lab Units 10/14/24  1817 10/14/24  0053   SODIUM mmol/L  --  137   POTASSIUM mmol/L  --  3.7   CHLORIDE mmol/L  --  103   CO2 mmol/L  --  19.0*   BUN mg/dL  --  7   CREATININE mg/dL  --  0.60   CALCIUM mg/dL  --  9.4   BILIRUBIN mg/dL  --  0.2   ALK PHOS U/L  --  129*   ALT (SGPT) U/L  --  16   AST (SGOT) U/L  --  23   GLUCOSE mg/dL 91 70       Discharge Disposition  Home or Self Care    Discharge Medications     Discharge Medications        New Medications        Instructions Start Date   ibuprofen 600 MG  tablet  Commonly known as: ADVIL,MOTRIN   600 mg, Oral, Every 6 Hours PRN             Continue These Medications        Instructions Start Date   budesonide 0.5 MG/2ML nebulizer solution  Commonly known as: PULMICORT   0.5 mg      budesonide-formoterol 160-4.5 MCG/ACT inhaler  Commonly known as: SYMBICORT   2 puffs      ferrous sulfate 325 (65 FE) MG tablet   325 mg, Oral, Daily With Breakfast      ondansetron ODT 4 MG disintegrating tablet  Commonly known as: ZOFRAN-ODT   4 mg, Oral, Every 8 Hours PRN      ProChamber VHC device   Inhalation, See Admin Instructions, with inhaler      WesTab Plus 27-1 MG tablet   1 tablet, Daily               Discharge Diet: Regular    Activity at Discharge:   Activity Instructions       Pelvic Rest              Follow-up Appointments  No future appointments.  Additional Instructions for the Follow-ups that You Need to Schedule       Call MD for problems / concerns.   As directed      Call for problems with:   1.  Heavy bleeding:  Greater than a pad an hour for more than 2 hours  2.  Fever greater than 101.3   3.  Redness of the episiotomy or vaginal laceration and/or severe pain increased from discharge pain.    4.  Signs of postpartum preeclampsia:  headache, visual changes, elevated blood pressure    Order Comments: Call for problems with: 1.  Heavy bleeding:  Greater than a pad an hour for more than 2 hours 2.  Fever greater than 101.3 3.  Redness of the episiotomy or vaginal laceration and/or severe pain increased from discharge pain.  4.  Signs of postpartum preeclampsia:  headache, visual changes, elevated blood pressure         Discharge Follow-up with Specified Provider: Mary Alice; 3 Weeks   As directed      To: Mary Alice   Follow Up: 3 Weeks   Follow Up Details: with Primary OB              Routine follow-up    Prenatal labs/vax:   Immunization History   Administered Date(s) Administered    31-influenza Vac Quardvalent Preservativ 11/30/2018, 12/11/2020    COVID-19 (PFIZER) Purple  Cap Monovalent 05/15/2021, 06/09/2021    Fluzone  >6mos 02/05/2018    Fluzone (or Fluarix & Flulaval for VFC) >6mos 10/02/2015, 09/19/2019, 10/14/2021, 11/21/2022    Hep B, Adolescent or Pediatric 05/14/2020    Hpv9 09/06/2016    Meningococcal MCV4P (Menactra) 11/14/2019    PPD Test 05/13/2024    Tdap 08/01/2024    Trumenba(meningococcal B) 11/14/2019, 05/14/2020       External Prenatal Results       Pregnancy Outside Results - Transcribed From Office Records - See Scanned Records For Details       Test Value Date Time    ABO  O  10/14/24 0053    Rh  Positive  10/14/24 0053    Antibody Screen  Negative  10/14/24 0053      ^ Normal  04/02/24     Varicella IgG       Rubella ^ Immune  04/02/24     Hgb  9.6 g/dL 10/16/24 0635       12.4 g/dL 10/14/24 0053      ^ 10.0 g/dL 08/23/24 0526      ^ 11.3 g/dL 08/22/24 0437      ^ 12.0 g/dL 08/21/24 1603       11.7 g/dL 08/01/24 1008      ^ 12.3 g/dL 05/31/24 2010    Hct  28.1 % 10/16/24 0635       38.0 % 10/14/24 0053      ^ 29.8 % 08/23/24 0526      ^ 33.4 % 08/22/24 0437      ^ 36.1 % 08/21/24 1603       36.6 % 08/01/24 1008      ^ 36.9 % 05/31/24 2010    HgB A1c        1h GTT  89 mg/dL 08/01/24 1008    3h GTT Fasting       3h GTT 1 hour       3h GTT 2 hour       3h GTT 3 hour        Gonorrhea (discrete)  Negative  06/05/24 1600    Chlamydia (discrete)  Negative  06/05/24 1600    RPR  Non Reactive  08/01/24 1008    Syphils cascade: TP-Ab (FTA)  Non-Reactive  10/14/24 0053      ^ nonreactive  04/02/24     TP-Ab  Non-Reactive  10/14/24 0053      ^ nonreactive  04/02/24     TP-Ab (EIA)       TPPA       HBsAg ^ Negative  04/02/24     Herpes Simplex Virus PCR       Herpes Simplex VIrus Culture       HIV ^ nonreactive  04/02/24     Hep C RNA Quant PCR       Hep C Antibody ^ nonreactive  04/02/24     AFP       NIPT       Cystic Fibroisis        Group B Strep  Positive  10/10/24     GBS Susceptibility to Clindamycin       GBS Susceptibility to Erythromycin       Fetal Fibronectin        Genetic Testing, Maternal Blood                 Drug Screening       Test Value Date Time    Urine Drug Screen       Amphetamine Screen       Barbiturate Screen       Benzodiazepine Screen       Methadone Screen       Phencyclidine Screen       Opiates Screen       THC Screen       Cocaine Screen       Propoxyphene Screen       Buprenorphine Screen       Methamphetamine Screen       Oxycodone Screen       Tricyclic Antidepressants Screen                 Legend    ^: Historical                              Braden Massey MD  10/17/24  15:21 EDT

## 2024-10-17 NOTE — LACTATION NOTE
This note was copied from a baby's chart.  Plans for discharge home today.  Currently pumping with hands free personal pump.  Reports getting about 25 ml now and breasts feel full. Has been bottle feeding EBM and plans to continue this at home. Educated on symptoms and treatment for engorgement, how to tell if baby is getting enough, and info given for OPLC.  Encouraged to call for any questions or concerns.

## 2024-10-17 NOTE — PROGRESS NOTES
Contacted by RN as pt reports chest tightness.  Pt denies acute soa but reports her chest feels tight which she notes she treats with nebulizer at home. She tried her symbicort inhaler but symptoms have not improved significantly.     O: O2 sat= 99 %  Gen: pt in no distress  Lungs: expiratory wheezes noted bilaterally otherwise clear, normal respiratory effort  Heart: regular rate and rhythm    A/p: Asthma exacerbation: re-ordered Duo Neb treatments  as well as pulmicort which she usually takes at home. Consult placed to pulmonary medicine for further management assistance.

## 2024-10-17 NOTE — PLAN OF CARE
Goal Outcome Evaluation:    Patient complained of SOA at 2030; pulse ox applied and sats were at 99% room air. Dr. Duvall on call notified of complaint and came to unit to assess patient. Patient used Symbicort inhaler in mean time for increasing SOB. Patient was stable other than SOA reports. Ordered was Proventil nebulizer solution q6 hrs PRN, Pulmicort nebulizer solution 2 times daily by RT, and Ipratropium-albuterol q 6 hrs while awake. RT called STAT and nebulizer treatments given. Pulmonary consult ordered and completed. Chest Xray and respiratory panel ordered and recommended but refused despite education from APRN and RN. Patient reports no SOB since that episode   VSS. Pain controlled with po meds. Fundus and lochia within normal limits. Ambulating independently. Voiding spontaneously. Breastfeeding and bottle feeding. No concerns at this time

## 2024-10-17 NOTE — PROGRESS NOTES
Pulmonary Update Note    I attempted to see the patient this morning, but she was unavailable at the time.  When I returned to see her later in the morning, she had already been discharged.    I did give her a call around 3 PM to make sure that somebody was following her on the outpatient side for her asthma and to see if she needed any refills of her inhalers. She reports that she has already has a pulmonologist that she follows as outpatient.

## 2024-10-17 NOTE — CONSULTS
Group: [facility]         CONSULT NOTE    Patient Identification:  Maria L Carlisle  18 y.o.  female  2005  [id number]            Requesting physician: Dr Duvall    Reason for Consultation: SOA related to asthma    History of Present Illness:  Maria L Carlisle is a 18-year-old female with past medical history childhood asthma, follows with Dr. Susy Sheriff with [facility].  She reports she has had frequent exacerbations this year related to pregnancy, was also treated for pneumonia and rhinovirus August 2024.  She underwent spontaneous vaginal delivery last night, she reports during induction with Pitocin she had an asthma attack and then a panic attack, this resolved with DuoNeb.  She has since been receiving scheduled bronchodilator however she is on Pulmicort nebs at home and has not received this while inpatient, she states she uses BID every day.  She reported to OB MD she had some chest tightness, wheezing and some shortness of air.  She denies current shortness of breath, states she received Pulmicort neb just prior to my arrival and she feels much better.  She is attempting to breast-feed, otherwise has no other complaints.  She denies fever, cough, chest pain, denies shortness of breath or chest tightness currently.  She is not a smoker.    Review of Systems  CONSTITUTIONAL:  Denies fevers or chills  EYE:  No new vision changes  EAR:  No change in hearing  CARDIAC:  No chest pain  PULMONARY:  Positive for wheezing with associated chest tightness, soa  GI:  No diarrhea, hematemesis or hematochezia  RENAL:  No dysuria or urinary frequency  MUSCULOSKELETAL:  No musculoskeletal complaints  ENDOCRINE:  No heat or cold intolerance  INTEGUMENTARY: No skin rashes  NEUROLOGICAL:  No dizziness or confusion.  No seizure activity  PSYCHIATRIC:  No new anxiety or depression  12 system review of systems performed and all else negative     Past Medical History:  Past Medical History:  "  Diagnosis Date    Asthma        Past Surgical History:  History reviewed. No pertinent surgical history.     Home Meds:  Medications Prior to Admission   Medication Sig Dispense Refill Last Dose/Taking    budesonide (PULMICORT) 0.5 MG/2ML nebulizer solution Inhale 2 mL.   10/13/2024 Morning    budesonide-formoterol (SYMBICORT) 160-4.5 MCG/ACT inhaler Inhale 2 puffs.   10/13/2024 Morning    ferrous sulfate 325 (65 FE) MG tablet Take 1 tablet by mouth Daily With Breakfast. 30 tablet 4 10/13/2024    ondansetron ODT (ZOFRAN-ODT) 4 MG disintegrating tablet Take 1 tablet by mouth Every 8 (Eight) Hours As Needed for Nausea or Vomiting. 30 tablet 0 Past Month    Prenatal Vit-Fe Fumarate-FA (WesTab Plus) 27-1 MG tablet Take 1 tablet by mouth Daily.   10/13/2024    Spacer/Aero-Holding Chambers (ProChamber VHC) device Inhale See Admin Instructions. with inhaler          Allergies:  Allergies   Allergen Reactions    Pumpkin Seed Oil Unknown - Low Severity     All pumpkin    Peanut-Containing Drug Products Rash       Social History:   Social History     Socioeconomic History    Marital status: Single   Tobacco Use    Smoking status: Never     Passive exposure: Never    Smokeless tobacco: Never   Vaping Use    Vaping status: Never Used   Substance and Sexual Activity    Alcohol use: Never    Drug use: Never       Family History:  Family History   Problem Relation Age of Onset    Other (peripartum cardiomyopathy) Mother        Physical Exam:  /72 (BP Location: Right arm, Patient Position: Sitting)   Pulse 85   Temp 98.5 °F (36.9 °C) (Oral)   Resp 16   Ht 153 cm (60.25\")   Wt 75.3 kg (166 lb)   LMP 01/29/2024   SpO2 97%   Breastfeeding Yes   BMI 32.15 kg/m²  Body mass index is 32.15 kg/m². 97% 75.3 kg (166 lb)    Physical Exam  Constitutional: Young female sitting up in bed, in no acute distress, room air  Head: NCAT  Eyes: No pallor, anicteric conjunctiva, PERRLA.  ENMT:  No oral thrush. Moist MM.   NECK: Trachea " "midline, no thyromegaly, no JVD  Heart: RRR, no pedal edema  Lungs: FLOR +, clear to auscultation throughout no wheezing or crackles  Abdomen: Soft. No tenderness, guarding or rigidity. No palpable masses.  Extremities: Extremities warm and well perfused. No cyanosis/ clubbing.   Neuro: Conscious, answers appropriately, no gross focal neuro deficits  Psych: Mood and affect appropriate    PPE recommended per [facility] infectious disease Isolation protocol for the current clinical scenario(as mentioned below) was followed.    LABS:  No results found for: \"COVID19\"    Lab Results   Component Value Date    CALCIUM 9.4 10/14/2024     Results from last 7 days   Lab Units 10/16/24  0635 10/14/24  1817 10/14/24  0053   SODIUM mmol/L  --   --  137   POTASSIUM mmol/L  --   --  3.7   CHLORIDE mmol/L  --   --  103   CO2 mmol/L  --   --  19.0*   BUN mg/dL  --   --  7   CREATININE mg/dL  --   --  0.60   GLUCOSE mg/dL  --  91 70   CALCIUM mg/dL  --   --  9.4   WBC 10*3/mm3 12.20*  --  12.65*   HEMOGLOBIN g/dL 9.6*  --  12.4   PLATELETS 10*3/mm3 183  --  233   ALT (SGPT) U/L  --   --  16   AST (SGOT) U/L  --   --  23     Lab Results   Component Value Date    TROPONINI <0.010 08/21/2024                 Results from last 7 days   Lab Units 10/15/24  0028 10/15/24  0024   MODALITY  Room Air Room Air                 Lab Results   Component Value Date    TSH 2.070 08/01/2019     Estimated Creatinine Clearance: 136.5 mL/min (by C-G formula based on SCr of 0.6 mg/dL).         Imaging: I personally visualized the images of scans/x-rays performed within last 3 days.      Assessment:  Asthma exacerbation  Pregnancy 37 weeks gestation s/p normal spontaneous vaginal delivery 10/15/24  Hx moderate to severe asthma since childhood  Leukocytosis  Anemia    Recommendations:  -She states she is feeling much better s/p duoneb, pulmicort neb.  Has no complaints currently, not requiring oxygen, no cough, afebrile.  Continue scheduled duonebs, " "pulmicort nebs BID. Incentive spirometer.  Supplemental nasal cannula oxygen if needed however has not required.  -Stat CXR ordered but patient and mother at bedside refused stating \"she doesn't need one\", educated benefits of CXR to r/o infectious etiology, pulmonary edema in the post-partum period, other causing asthma exacerbation  -Will get RVP to r/o and she is agreeable to this   -Mild leukocytosis likely reactive, should monitor for any infectious symptomatology    Paris Randolph, APRN  10/16/2024  21:02 EDT      Much of this encounter note is an electronic transcription/translation of spoken language to printed text using Dragon Software.   "

## 2024-10-17 NOTE — PROGRESS NOTES
Pulmonary/CC note    Notified by RN patient refusing.  I educated pt and mother of benefits on exam.  Nursing may cancel.

## 2024-10-17 NOTE — PROGRESS NOTES
[facility]   Obstetrics and Gynecology     10/17/2024    Name:Maria L Carlisle   MR#:[id number]    Vaginal Delivery Progress Note    HD#3    Subjective   Postpartum Day 2: 18 y.o. female  delivered at 37w1d  delivered a male infant.     The patient feels well.  Her pain is controlled.    She is ambulating well.  Patient describes her bleeding as thin lochia    Patient reports breathing is much better today.    Breastfeeding/bottle:  The patient is currently breastfeeding.    Patient Active Problem List   Diagnosis    37 weeks gestation of pregnancy    Echogenic intracardiac focus of fetus on prenatal ultrasound    Maternal anemia in pregnancy, antepartum    Moderate persistent asthma with exacerbation    IUGR (intrauterine growth restriction) affecting care of mother, third trimester, fetus 1    Pregnancy     (normal spontaneous vaginal delivery)       Objective   Vital Signs Range for the last 24 hours  Temp: Temp:  [97.8 °F (36.6 °C)-98.6 °F (37 °C)] 98.1 °F (36.7 °C) Temp src: Oral   BP: BP: (112-142)/(72-79) 124/75        Pulse: Heart Rate:  [] 102  RR: Resp:  [16] 16  Weight: 75.3 kg (166 lb)  BMI:  Body mass index is 32.15 kg/m².    Results from last 7 days   Lab Units 10/16/24  0635 10/14/24  0053   WBC 10*3/mm3 12.20* 12.65*   HEMOGLOBIN g/dL 9.6* 12.4   HEMATOCRIT % 28.1* 38.0   PLATELETS 10*3/mm3 183 233     Results from last 7 days   Lab Units 10/14/24  1817 10/14/24  0053   SODIUM mmol/L  --  137   POTASSIUM mmol/L  --  3.7   CHLORIDE mmol/L  --  103   CO2 mmol/L  --  19.0*   BUN mg/dL  --  7   CREATININE mg/dL  --  0.60   CALCIUM mg/dL  --  9.4   ALK PHOS U/L  --  129*   ALT (SGPT) U/L  --  16   AST (SGOT) U/L  --  23   GLUCOSE mg/dL 91 70       Physical Exam  Vitals and nursing note reviewed.   Constitutional:       General: She is not in acute distress.     Appearance: Normal appearance. She is well-developed. She is not ill-appearing.   HENT:      Head: Normocephalic.    Pulmonary:      Effort: Pulmonary effort is normal.      Breath sounds: No wheezing.   Abdominal:      General: Abdomen is flat. There is no distension.      Palpations: Abdomen is soft. There is no mass.      Tenderness: There is no abdominal tenderness.   Genitourinary:     Vagina: Normal.      Comments: Lochia is scant  Fundus is firm   Musculoskeletal:         General: No swelling or tenderness.      Comments: No calf tenderness or swelling    Neurological:      Mental Status: She is alert and oriented to person, place, and time.   Psychiatric:         Behavior: Behavior normal.         Thought Content: Thought content normal.         Judgment: Judgment normal.         Assessment/Plan   1.  PPD# 2    Pregnancy    37 weeks gestation of pregnancy    Echogenic intracardiac focus of fetus on prenatal ultrasound    Maternal anemia in pregnancy, antepartum    Moderate persistent asthma with exacerbation    IUGR (intrauterine growth restriction) affecting care of mother, third trimester, fetus 1     (normal spontaneous vaginal delivery)      Plan:   Discharge today     Braden Massey MD  10/17/2024 09:23 EDT

## 2024-10-17 NOTE — NURSING NOTE
VSS for remainder of shift. No further complaints of SOA.  Pain managed and controlled. Fundus and lochia within normal limits. Ambulating independently. Voiding spontaneously. No concerns at this time.

## 2024-10-25 ENCOUNTER — PATIENT OUTREACH (OUTPATIENT)
Dept: LABOR AND DELIVERY | Facility: HOSPITAL | Age: 19
End: 2024-10-25
Payer: COMMERCIAL

## 2024-10-25 NOTE — OUTREACH NOTE
Motherhood Connection  Postpartum Check-In    Questions/Answers      Flowsheet Row Responses   Visit Setting Telephone   OB Discharge Note Reviewed  Reviewed   OB Discharge Medications Reviewed  Reviewed    discharged home with mother? Yes   Current Pain Levels 0-10 2   At Rest Pain Levels 0-10 2   Pain level with activity 0-10 3   Acceptable Pain Level 0-10 2   Pain Location Perineum   Pain Description Sore   How do you treat your pain? Medications   Verbalized Emotional State Acceptance   Family/Support Network Mother, Significant Other   Level of Involvement in Care Supportive, Interactive, Attentive   Do you feel comfortable in your relationship with your baby? Yes   Have members of your household adjusted to your baby? Yes   Is the baby's father supportive and/or involved with the baby? Yes   How does your partner feel about the baby? Happy, Involved   Do you feel safe at home, school and work? Yes   Are you in a relationship with someone who threatens you or hurts you? No   Do you have the resources to keep yourself and your baby healthy and safe? Yes   Lochia (per patient report) None noted   Amount None   Lochia Odor None   Is patient breastfeeding? Yes, pumping   pumping - Left Breast Nontender, Full   Pumping - Right Breast Nontender, Full   Frequency every 2 hours after nursing   Pumping Quantity 4 oz   Storage of Milk bottles or freezer   Postpartum Depression Screening Education Education Provided   Doctor Appointments: Education Provided   Family Planning Education Education Provided   Postpartum Care Education Education Provided   S & S to report Education Provided   Followup Appointments Made No   Well Child Visit Appointments Made Yes   Well Child Checkup Provider Name [facility]   Well Child Check Up Date: 10/24/24   Did you complete the visit? Yes   Were there any specific concerns? Yes   Concerns: just jaundice, improving now   Has additional follow-up with pediatrician or specialist  been recommended? No   Umbilical Cord No reported signs or symptoms   Was the baby circumcised? Yes   Circumcision care and signs/symptoms to report Reviewed   Feeding Readiness Cues: Crying, Eager   Infant Feeding Method Breast, Formula   Is a lactation referral indicated? No   Frequency of feedings every 2 hours   Formula Type --  [neosure]   Formula PO (mL) 1 oz   Formula/Expressed Milk frequency of feedings: one per day   Number of wet diapers x 24 hours 10   Last BM x 24 hours 5   What safe sleep surface is available? Bassinet   Are there stuffed animals, toys, pillows, quilts, blankets, wedges, positioners, bumpers or other loose bedding in the infant's sleeping environment? No   Where does the baby usually sleep? Bassinet   Does the baby ever share a sleep surface with a sibling, adult or pet? No   Does the baby ever share a sleep surface in a bed, couch, recliner or other? No   What position do you place your baby to sleep for naps? Back   What position do you place your baby to sleep at night Back   Are you and/or other caregivers smoking inside or outside the baby's home? No   Is the infant dressed appropiately for the temperature of the home? Yes   Do you use a clean, dry pacifier that is not attached to a string or stuffed animal? No            Review of Systems    Most Recent [place]  Depression Scale Score (EPDS)    Performed by a clinician: 3 (10/25/2024 11:46 AM)    5 Ps Screen  Done     Pt doing well, reports minimal pain and no vaginal bleeding today. Reports that her asthma has been stable. She is breastfeeding and pumping after, giving one bottle of formula a day. Infant has been seen at the peds office with no reported concerns other than jaundice. She has put in a request to schedule her PP visit. BEN lives out of town but is involved and she reports good support from her mom. Will send to RN call center.     Isabell BOURNE - RN  Maternity Nurse Navigator    10/25/2024, 12:07  EDT

## 2024-11-01 ENCOUNTER — PATIENT OUTREACH (OUTPATIENT)
Dept: CALL CENTER | Facility: HOSPITAL | Age: 19
End: 2024-11-01
Payer: COMMERCIAL

## 2024-11-01 NOTE — OUTREACH NOTE
Motherhood Connection Survey      Flowsheet Row Responses   [facility]   [facility] attempt successful? Yes   Call start time 1504   Call end time 1510   Baby sex Boy   [place] discharged home with mother? Yes   Baby sex Boy   Delivery type Vaginal   Emotional state Acceptance   Family support Yes   Do you have all necessary resources to care for you and your baby?  Yes   Have members of your household adjusted to your baby? Yes   Did you have any problems with pre-eclampsia during this pregnancy? No   Did you have blood glucose issues during this pregnancy No   Lochia amount None   Did you have an episiotomy/tear/abdominal incision? Yes   Feeding Method Breast   Frequency q 1-2 hrs   Duration 15-30 min   Pumping Yes   Storage of Milk freezer   Supplementing Breast Milk   Breast Condition No   Nipple Condition No   Nursing Interventions Lactation education provided   Number of wet diapers x 24 hours 15   Last BM x 24 hours 10   Umbilical Cord No reported signs or symptoms   Umbilical cord comments cord off   Was the baby circumcised? Yes   Circumcision care and signs/symptoms to report Reviewed   Circumcision comments healed   Where does the baby usually sleep? Bassinet   Are there stuffed animals, toys, pillows, quilts, blankets, wedges, positioners, bumpers or other loose bedding in the infant's sleeping environment? No   Does the baby ever share a sleep surface in a bed, couch, recliner or other? No   What position do you lay your baby down to sleep? Back   Are you and/or other caregivers smoking inside or outside the baby's home? No   Mom appointment comments: pp f/u scheduled   Baby appointment comments: Peds visits done   Additional comments eager to eat   Feeding method comment 4 oz   Call completed? Yes   How satisfied were you with the Motherhood Connection Program? 5              Kandy BOURNE - Registered Nurse

## 2024-11-06 ENCOUNTER — TELEPHONE (OUTPATIENT)
Dept: OBSTETRICS AND GYNECOLOGY | Age: 19
End: 2024-11-06
Payer: COMMERCIAL

## 2024-11-06 NOTE — TELEPHONE ENCOUNTER
Hanny , Patient calling to schedule 3 wk's postpartum  visit [facility] office , what day is good no appointments available . Vaginal delivery 10/15/24 .

## 2024-11-13 ENCOUNTER — POSTPARTUM VISIT (OUTPATIENT)
Dept: OBSTETRICS AND GYNECOLOGY | Age: 19
End: 2024-11-13
Payer: COMMERCIAL

## 2024-11-13 VITALS
BODY MASS INDEX: 30.74 KG/M2 | HEIGHT: 60 IN | DIASTOLIC BLOOD PRESSURE: 84 MMHG | SYSTOLIC BLOOD PRESSURE: 112 MMHG | WEIGHT: 156.6 LBS

## 2024-11-13 DIAGNOSIS — L98.8 SKIN LESION OF BREAST: ICD-10-CM

## 2024-11-13 DIAGNOSIS — Z30.013 ENCOUNTER FOR INITIAL PRESCRIPTION OF INJECTABLE CONTRACEPTIVE: ICD-10-CM

## 2024-11-13 DIAGNOSIS — N63.14 BREAST LUMP ON RIGHT SIDE AT 4 O'CLOCK POSITION: ICD-10-CM

## 2024-11-13 DIAGNOSIS — Z30.42 DEPO-PROVERA CONTRACEPTIVE STATUS: ICD-10-CM

## 2024-11-13 RX ORDER — MEDROXYPROGESTERONE ACETATE 150 MG/ML
150 INJECTION, SUSPENSION INTRAMUSCULAR
Qty: 1 ML | Refills: 3 | Status: SHIPPED | OUTPATIENT
Start: 2024-11-13 | End: 2024-11-13

## 2024-11-13 RX ORDER — MEDROXYPROGESTERONE ACETATE 150 MG/ML
150 INJECTION, SUSPENSION INTRAMUSCULAR
Qty: 1 ML | Refills: 3 | Status: SHIPPED | OUTPATIENT
Start: 2024-11-13

## 2024-11-13 RX ORDER — MEDROXYPROGESTERONE ACETATE 150 MG/ML
150 INJECTION, SUSPENSION INTRAMUSCULAR ONCE
Status: COMPLETED | OUTPATIENT
Start: 2024-11-13 | End: 2024-11-13

## 2024-11-13 RX ADMIN — MEDROXYPROGESTERONE ACETATE 150 MG: 150 INJECTION, SUSPENSION INTRAMUSCULAR at 11:31

## 2024-11-13 NOTE — PROGRESS NOTES
"[facility]   Obstetrics and Gynecology   Postpartum Visit    2024    Patient: Maria L Carlisle          MR#:[id number]    History of Present Illness    Chief Complaint   Patient presents with    Postpartum Care     CC: postpartum delivered 10/15/24 via vaginal. Baby Ventura 5lb 8 oz breast feeding. Pt is wanting to get on birthcontrol.       19 y.o. female  status post vaginal delivery presents for postpartum visit without complaints.  Mood stable.  Breast-feeding without issue.  Bleeding has resolved.  No intercourse since delivery.    Contraception: DMPA   Vaccines: up to date  Pap: will get done at 6 week PP visit   ________________________________________  Patient Active Problem List   Diagnosis    37 weeks gestation of pregnancy    Echogenic intracardiac focus of fetus on prenatal ultrasound    Maternal anemia in pregnancy, antepartum    Moderate persistent asthma with exacerbation    IUGR (intrauterine growth restriction) affecting care of mother, third trimester, fetus 1    Pregnancy     (normal spontaneous vaginal delivery)       Past Medical History:   Diagnosis Date    Asthma        History reviewed. No pertinent surgical history.    Social History     Tobacco Use   Smoking Status Never    Passive exposure: Never   Smokeless Tobacco Never       has a current medication list which includes the following prescription(s): budesonide, budesonide-formoterol, westab plus, ferrous sulfate, ibuprofen, medroxyprogesterone, ondansetron odt, and prochamber c.  ________________________________________         Review of Systems   All other systems reviewed and are negative.      Objective     /84   Ht 153 cm (60.24\")   Wt 71 kg (156 lb 9.6 oz)   LMP 2024   Breastfeeding Yes   BMI 30.34 kg/m²    BP Readings from Last 3 Encounters:   24 112/84   10/17/24 124/75   10/10/24 130/92      Wt Readings from Last 3 Encounters:   24 71 kg (156 lb 9.6 oz)   10/14/24 75.3 " "kg (166 lb)   10/10/24 76.7 kg (169 lb)      BMI: Estimated body mass index is 30.34 kg/m² as calculated from the following:    Height as of this encounter: 153 cm (60.24\").    Weight as of this encounter: 71 kg (156 lb 9.6 oz).    EXAM     General:     Patient appears well in NAD  Respiratory: Normal effort, no distress  Breast:  declined  Abdomen: Soft, NT, no acute findings  Pelvic:  Scant lochia, perineum without signs of infection, uterus small and nontender  Ext:  No cyanosis or edema    Assessment:    Diagnoses and all orders for this visit:    1. Postpartum follow-up (Primary)  Comments:  doing well postpartum  breastfeeding  would like to start DMPA  Plan for pap at 6 week PP visit    2. Encounter for initial prescription of injectable contraceptive  -     Discontinue: medroxyPROGESTERone (DEPO-PROVERA) 150 MG/ML injection; Inject 1 mL into the appropriate muscle as directed by prescriber Every 3 (Three) Months.  Dispense: 1 mL; Refill: 3  -     medroxyPROGESTERone (DEPO-PROVERA) 150 MG/ML injection; Inject 1 mL into the appropriate muscle as directed by prescriber Every 3 (Three) Months.  Dispense: 1 mL; Refill: 3    3. Breast lump on right side at 4 o'clock position  -     US Breast Right Limited; Future    4. Skin lesion of breast  -     Ambulatory Referral to Dermatology        Plan:  Return in about 2 weeks (around 11/27/2024).      Nan Wehat MD  11/13/2024 10:10 EST    "

## 2024-11-15 ENCOUNTER — APPOINTMENT (OUTPATIENT)
Dept: WOMENS IMAGING | Facility: HOSPITAL | Age: 19
End: 2024-11-15
Payer: COMMERCIAL

## 2024-11-15 PROCEDURE — 76642 ULTRASOUND BREAST LIMITED: CPT | Performed by: RADIOLOGY

## 2024-11-27 ENCOUNTER — POSTPARTUM VISIT (OUTPATIENT)
Dept: OBSTETRICS AND GYNECOLOGY | Age: 19
End: 2024-11-27
Payer: COMMERCIAL

## 2024-11-27 VITALS
WEIGHT: 159 LBS | HEIGHT: 60 IN | BODY MASS INDEX: 31.22 KG/M2 | DIASTOLIC BLOOD PRESSURE: 92 MMHG | SYSTOLIC BLOOD PRESSURE: 120 MMHG

## 2024-11-27 DIAGNOSIS — Z12.4 SCREENING FOR CERVICAL CANCER: ICD-10-CM

## 2024-11-27 DIAGNOSIS — R03.0 ELEVATED BLOOD PRESSURE READING: Primary | ICD-10-CM

## 2024-11-27 NOTE — PROGRESS NOTES
"[facility]   Obstetrics and Gynecology   Postpartum Visit    2024    Patient: Maria L Carlisle          MR#:[id number]    History of Present Illness    Chief Complaint   Patient presents with    Postpartum Care     CC: 6 weeks postpartum delivered 10/15/24 via vaginal. Baby Ventura 5lb 8 oz breast  feeding.       19 y.o. female  status post vaginal delivery presents for postpartum visit without complaints.  Mood stable.  Breast-feeding without issue.  Bleeding is minimal.  No intercourse since delivery.    Contraception: DMPA  Vaccines: up to date  Pap: collected today   ________________________________________  Patient Active Problem List   Diagnosis    37 weeks gestation of pregnancy    Echogenic intracardiac focus of fetus on prenatal ultrasound    Maternal anemia in pregnancy, antepartum    Moderate persistent asthma with exacerbation    IUGR (intrauterine growth restriction) affecting care of mother, third trimester, fetus 1    Pregnancy     (normal spontaneous vaginal delivery)       Past Medical History:   Diagnosis Date    Asthma        History reviewed. No pertinent surgical history.    Social History     Tobacco Use   Smoking Status Never    Passive exposure: Never   Smokeless Tobacco Never       has a current medication list which includes the following prescription(s): budesonide, budesonide-formoterol, medroxyprogesterone, westab plus, ferrous sulfate, ibuprofen, ondansetron odt, and prochamber vhc.  ________________________________________         Review of Systems   All other systems reviewed and are negative.      Objective     /92   Ht 153 cm (60.24\")   Wt 72.1 kg (159 lb)   Breastfeeding Yes   BMI 30.81 kg/m²    BP Readings from Last 3 Encounters:   24 120/92   24 112/84   10/17/24 124/75      Wt Readings from Last 3 Encounters:   24 72.1 kg (159 lb)   24 71 kg (156 lb 9.6 oz)   10/14/24 75.3 kg (166 lb)      BMI: Estimated body mass " "index is 30.81 kg/m² as calculated from the following:    Height as of this encounter: 153 cm (60.24\").    Weight as of this encounter: 72.1 kg (159 lb).    EXAM     General:     Patient appears well in NAD  Respiratory: Normal effort, no distress  Breast:  declined  Abdomen: Soft, NT, no acute findings  Pelvic:  Scant lochia, perineum without signs of infection, uterus small and nontender  Ext:  No cyanosis or edema    Assessment:    Diagnoses and all orders for this visit:    1. Elevated blood pressure reading (Primary)  -     Ambulatory Referral to Family Practice    2. Postpartum follow-up  Comments:  - doing well PP  - Exam wnl.   - Mood stable   - DMPA for contraception    3. Screening for cervical cancer  -     IGP,rfx Aptima HPV All Pth        Plan:  Return in about 1 year (around 11/27/2025).      Nan Wheat MD  11/27/2024 10:25 EST    "

## 2024-12-09 LAB
CONV .: NORMAL
CYTOLOGIST CVX/VAG CYTO: NORMAL
CYTOLOGY CVX/VAG DOC CYTO: NORMAL
CYTOLOGY CVX/VAG DOC THIN PREP: NORMAL
DX ICD CODE: NORMAL
Lab: NORMAL
OTHER STN SPEC: NORMAL
PATHOLOGIST CVX/VAG CYTO: NORMAL
STAT OF ADQ CVX/VAG CYTO-IMP: NORMAL

## 2024-12-10 ENCOUNTER — OFFICE VISIT (OUTPATIENT)
Dept: INTERNAL MEDICINE | Facility: CLINIC | Age: 19
End: 2024-12-10
Payer: COMMERCIAL

## 2024-12-10 VITALS
TEMPERATURE: 96.9 F | OXYGEN SATURATION: 100 % | BODY MASS INDEX: 31.41 KG/M2 | SYSTOLIC BLOOD PRESSURE: 118 MMHG | WEIGHT: 160 LBS | HEIGHT: 60 IN | DIASTOLIC BLOOD PRESSURE: 78 MMHG | HEART RATE: 76 BPM

## 2024-12-10 DIAGNOSIS — L98.9 SKIN ABNORMALITIES: ICD-10-CM

## 2024-12-10 DIAGNOSIS — N63.20 MASS OF LEFT BREAST, UNSPECIFIED QUADRANT: ICD-10-CM

## 2024-12-10 DIAGNOSIS — Z76.89 ENCOUNTER TO ESTABLISH CARE: Primary | ICD-10-CM

## 2024-12-10 PROBLEM — J45.40 MODERATE PERSISTENT ASTHMA: Status: ACTIVE | Noted: 2021-10-14

## 2024-12-10 PROBLEM — Z34.90 PREGNANCY: Status: RESOLVED | Noted: 2024-10-14 | Resolved: 2024-12-10

## 2024-12-10 PROBLEM — Z13.0 ENCOUNTER FOR SCREENING FOR DISEASES OF THE BLOOD AND BLOOD-FORMING ORGANS AND CERTAIN DISORDERS INVOLVING THE IMMUNE MECHANISM: Status: ACTIVE | Noted: 2019-06-12

## 2024-12-10 PROBLEM — N92.0 MENORRHAGIA: Status: ACTIVE | Noted: 2019-06-12

## 2024-12-10 PROBLEM — J45.901 EXACERBATION OF ASTHMA: Status: ACTIVE | Noted: 2024-08-21

## 2024-12-10 PROBLEM — J30.9 ALLERGIC RHINITIS: Status: ACTIVE | Noted: 2021-09-02

## 2024-12-10 PROBLEM — Z3A.37 37 WEEKS GESTATION OF PREGNANCY: Status: RESOLVED | Noted: 2024-06-05 | Resolved: 2024-12-10

## 2024-12-10 PROCEDURE — 99213 OFFICE O/P EST LOW 20 MIN: CPT

## 2024-12-10 NOTE — PROGRESS NOTES
Chief Complaint  Establish Care (Last PCP was pediatrics ) and Skin Discoloration (Rt breast, lt neck and back x1-2 years )     Subjective:      History of Present Illness {CC  Problem List  Visit  Diagnosis   Encounters  Notes  Medications  Labs  Result Review Imaging  Media :23}     Maria L Carlisle presents to [facility] for:    Patient or patient representative verbalized consent for the use of Ambient Listening during the visit with  DEEPAK Restrepo for chart documentation. 2024  13:58 EST     History of Present Illness          Ms Carlisle presents to [facility].    She recently gave birth to a baby boy in 2024, under the care of Dr. Wheat. The delivery was natural, with no complications necessitating a . She was induced during labor and is currently breastfeeding. Her blood glucose levels remained within normal limits throughout her pregnancy, and she did not require insulin therapy. She was on iron supplements during her pregnancy but has since discontinued them and is uncertain about the need for their continuation. She has received the HPV vaccine in 2016 and is up to date with her tetanus vaccine. She has also ensured that her  has received all necessary vaccinations. She is scheduled to resume work next week.    She has observed a persistent skin spot for the past 2 years, which has not resolved. Additionally, she noticed another spot on her right side that appeared during her pregnancy. She had an appointment scheduled with a dermatologist but did not receive any follow-up communication regarding the same.    She had a mass in her left breast, which was evaluated via ultrasound and found to be of normal size. She was advised to monitor it. She reports no current discharge from the breast.    MEDICATIONS  Discontinued: Iron supplements    IMMUNIZATIONS  She has received the HPV vaccine in . She is up to date with her  tetanus vaccine.         I have reviewed patient's medical history, any new submitted information provided by patient or medical assistant and updated medical record.      Objective:      Physical Exam  Vitals reviewed.   Constitutional:       General: She is awake. She is not in acute distress.     Appearance: Normal appearance. She is not ill-appearing, toxic-appearing or diaphoretic.   HENT:      Head: Normocephalic.      Right Ear: Hearing normal.      Left Ear: Hearing normal.      Nose: Nose normal.      Mouth/Throat:      Lips: Pink.      Mouth: Mucous membranes are moist.   Eyes:      General: Lids are normal. Vision grossly intact.      Conjunctiva/sclera: Conjunctivae normal.   Cardiovascular:      Rate and Rhythm: Normal rate and regular rhythm.      Pulses: Normal pulses.      Heart sounds: Normal heart sounds, S1 normal and S2 normal.   Pulmonary:      Effort: Pulmonary effort is normal.      Breath sounds: Normal breath sounds.   Abdominal:      General: Abdomen is flat. Bowel sounds are normal.      Palpations: Abdomen is soft.   Musculoskeletal:      Right lower leg: No edema.      Left lower leg: No edema.   Skin:     General: Skin is warm and dry.      Capillary Refill: Capillary refill takes less than 2 seconds.   Neurological:      General: No focal deficit present.      Mental Status: She is alert and oriented to person, place, and time. Mental status is at baseline.   Psychiatric:         Attention and Perception: Attention and perception normal.         Mood and Affect: Mood and affect normal.         Speech: Speech normal.         Behavior: Behavior normal. Behavior is cooperative.         Cognition and Memory: Cognition and memory normal.         Judgment: Judgment normal.        Result Review  Data Reviewed:{ Labs  Result Review  Imaging  Med Tab  Media :23}     Results  Imaging  Ultrasound of left breast showed normal size mass.       The following data was reviewed by: Shari  "Dane, APRN on 12/10/2024  CMP          10/14/2024    00:53 10/14/2024    18:17   CMP   Glucose 70  91    BUN 7     Creatinine 0.60     EGFR 132.0     Sodium 137     Potassium 3.7     Chloride 103     Calcium 9.4     Total Protein 6.6     Albumin 3.7     Globulin 2.9     Total Bilirubin 0.2     Alkaline Phosphatase 129     AST (SGOT) 23     ALT (SGPT) 16     Albumin/Globulin Ratio 1.3     BUN/Creatinine Ratio 11.7     Anion Gap 15.0       CBC          8/23/2024    05:26 10/14/2024    00:53 10/16/2024    06:35   CBC   WBC 12.76     12.65  12.20    RBC 3.32     4.18  3.04    Hemoglobin 10.0     12.4  9.6    Hematocrit 29.8     38.0  28.1    MCV 89.8     90.9  92.4    MCH 30.1     29.7  31.6    MCHC 33.6     32.6  34.2    RDW 12.9     13.4  13.1    Platelets 194     233  183       Details          This result is from an external source.             CBC w/diff          8/23/2024    05:26 10/14/2024    00:53 10/16/2024    06:35   CBC w/Diff   WBC 12.76     12.65  12.20    RBC 3.32     4.18  3.04    Hemoglobin 10.0     12.4  9.6    Hematocrit 29.8     38.0  28.1    MCV 89.8     90.9  92.4    MCH 30.1     29.7  31.6    MCHC 33.6     32.6  34.2    RDW 12.9     13.4  13.1    Platelets 194     233  183    Neutrophil Rel % 84.5     67.2  61.8    Immature Granulocyte Rel % 1.2     0.6  0.7    Lymphocyte Rel % 11.4     23.0  27.1    Monocyte Rel % 2.4     5.1  6.4    Eosinophil Rel % 0.3     3.9  3.7    Basophil Rel % 0.2     0.2  0.3       Details          This result is from an external source.                        Vital Signs:   /78 (BP Location: Left arm, Patient Position: Sitting, Cuff Size: Adult)   Pulse 76   Temp 96.9 °F (36.1 °C) (Temporal)   Ht 153 cm (60.24\")   Wt 72.6 kg (160 lb)   SpO2 100%   BMI 31.00 kg/m²                  Requested Prescriptions      No prescriptions requested or ordered in this encounter       Routine medications provided by this office will also be refilled via pharmacy request. "       Current Outpatient Medications:     budesonide (PULMICORT) 0.5 MG/2ML nebulizer solution, Inhale 2 mL., Disp: , Rfl:     budesonide-formoterol (SYMBICORT) 160-4.5 MCG/ACT inhaler, Inhale 2 puffs., Disp: , Rfl:     medroxyPROGESTERone (DEPO-PROVERA) 150 MG/ML injection, Inject 1 mL into the appropriate muscle as directed by prescriber Every 3 (Three) Months., Disp: 1 mL, Rfl: 3    Prenatal Vit-Fe Fumarate-FA (WesTab Plus) 27-1 MG tablet, Take 1 tablet by mouth Daily., Disp: , Rfl:     Spacer/Aero-Holding Chambers (ProChamber VHC) device, Inhale See Admin Instructions. with inhaler, Disp: , Rfl:      Assessment and Plan:      Assessment and Plan {CC Problem List  Visit Diagnosis  ROS  Review (Popup)  Health Maintenance  Quality  BestPractice  Medications  SmartSets  SnapShot Encounters  Media :23}     Problem List Items Addressed This Visit    None  Visit Diagnoses       Encounter to establish care    -  Primary    Skin abnormalities        Referral to Derm placed today    Mass of left breast, unspecified quadrant        if mass changes in size, shape or tenderness please report to our office for follow up                 1. Establishment of care.  She is due for her routine annual physical and lab work. Her annual physical examination and laboratory tests will be deferred until February 2025 to allow for the stabilization of her white blood cell count. She is advised to continue her iron supplementation during the breastfeeding period. A comprehensive discussion regarding the office's operational procedures was conducted. She was informed that she could seek immediate medical attention from any available physician within the office for minor ailments such as coughs, colds, sneezes, or sniffles. However, for more serious conditions requiring medication management, she would need to consult with me directly. She was also made aware that all annual visits and physical examinations would be conducted by  me. The availability of diagnostic imaging services, including x-rays, CT scans, mammograms, and MRIs, within this building was communicated. In the event that she is unable to secure a same-day appointment with one of our physicians, she has the option to visit the urgent care facility located on the ground floor. She was advised to fast prior to her next visit for a physical examination in 1 to 2 months to facilitate accurate baseline measurements. It was recommended that she schedule an annual visit with me, given her young age, and to seek immediate medical attention should any health concerns arise.    2. Skin spots.  She reported having skin spots that have been present for 2 years, with one appearing during her pregnancy. A referral to a dermatologist will be arranged for further evaluation.    3. Mass in left breast.  She had an ultrasound for a mass in her left breast, which was reported to be of normal size and recommended for monitoring.    Follow-up  The patient is scheduled for a follow-up visit in 2 months.     Today we have placed a referral or ordered further testing:     A team member from [facility] will contact you within the next 3-5 business days to schedule your tests or referral.    If you have not heard them within this time frame, they can be contacted at [phone]    If it was an outside referral: contact our office if you have not been contacted for appointment after 7-10 business days.      Thank you for allowing us to care for you,  DEEPAK Restrepo    Follow Up {Instructions Charge Capture  Follow-up Communications :23}     Return in about 2 months (around 2/10/2025) for Annual physical.      Patient was given instructions and counseling regarding her condition or for health maintenance advice. Please see specific information pulled into the AVS if appropriate.        Dragon disclaimer:   Much of this encounter note is an electronic transcription/translation  of spoken language to printed text. The electronic translation of spoken language may permit erroneous, or at times, nonsensical words or phrases to be inadvertently transcribed; Although I have reviewed the note for such errors, some may still exist.     Additional Patient Counseling:       There are no Patient Instructions on file for this visit.

## 2025-02-10 ENCOUNTER — OFFICE VISIT (OUTPATIENT)
Dept: INTERNAL MEDICINE | Facility: CLINIC | Age: 20
End: 2025-02-10
Payer: COMMERCIAL

## 2025-02-10 VITALS
WEIGHT: 162 LBS | HEIGHT: 60 IN | OXYGEN SATURATION: 99 % | HEART RATE: 89 BPM | BODY MASS INDEX: 31.8 KG/M2 | SYSTOLIC BLOOD PRESSURE: 110 MMHG | DIASTOLIC BLOOD PRESSURE: 80 MMHG

## 2025-02-10 DIAGNOSIS — Z13.29 SCREENING FOR THYROID DISORDER: ICD-10-CM

## 2025-02-10 DIAGNOSIS — Z13.6 SCREENING FOR CARDIOVASCULAR CONDITION: ICD-10-CM

## 2025-02-10 DIAGNOSIS — Z00.00 ANNUAL PHYSICAL EXAM: Primary | ICD-10-CM

## 2025-02-10 DIAGNOSIS — D50.9 IRON DEFICIENCY ANEMIA, UNSPECIFIED IRON DEFICIENCY ANEMIA TYPE: Chronic | ICD-10-CM

## 2025-02-10 PROCEDURE — 1160F RVW MEDS BY RX/DR IN RCRD: CPT

## 2025-02-10 PROCEDURE — 99395 PREV VISIT EST AGE 18-39: CPT

## 2025-02-10 PROCEDURE — 2014F MENTAL STATUS ASSESS: CPT

## 2025-02-10 PROCEDURE — 1159F MED LIST DOCD IN RCRD: CPT

## 2025-02-10 RX ORDER — PREDNISONE 10 MG/1
TABLET ORAL
COMMUNITY
Start: 2025-02-04

## 2025-02-10 RX ORDER — PNV,CALCIUM 72/IRON/FOLIC ACID 27 MG-1 MG
1 TABLET ORAL DAILY
Qty: 30 TABLET | Refills: 0 | Status: SHIPPED | OUTPATIENT
Start: 2025-02-10

## 2025-02-10 RX ORDER — FLUTICASONE FUROATE, UMECLIDINIUM BROMIDE AND VILANTEROL TRIFENATATE 200; 62.5; 25 UG/1; UG/1; UG/1
1 POWDER RESPIRATORY (INHALATION) DAILY
COMMUNITY
Start: 2025-02-04

## 2025-02-10 NOTE — PATIENT INSTRUCTIONS
Preventive Care 21-39 Years Old, Female  Preventive care refers to lifestyle choices and visits with your health care provider that can promote health and wellness. Preventive care visits are also called wellness exams.  What can I expect for my preventive care visit?  Counseling  During your preventive care visit, your health care provider may ask about your:  Medical history, including:  Past medical problems.  Family medical history.  Pregnancy history.  Current health, including:  Menstrual cycle.  Method of birth control.  Emotional well-being.  Home life and relationship well-being.  Sexual activity and sexual health.  Lifestyle, including:  Alcohol, nicotine or tobacco, and drug use.  Access to firearms.  Diet, exercise, and sleep habits.  Work and work environment.  Sunscreen use.  Safety issues such as seatbelt and bike helmet use.  Physical exam  Your health care provider may check your:  Height and weight. These may be used to calculate your BMI (body mass index). BMI is a measurement that tells if you are at a healthy weight.  Waist circumference. This measures the distance around your waistline. This measurement also tells if you are at a healthy weight and may help predict your risk of certain diseases, such as type 2 diabetes and high blood pressure.  Heart rate and blood pressure.  Body temperature.  Skin for abnormal spots.  What immunizations do I need?    Vaccines are usually given at various ages, according to a schedule. Your health care provider will recommend vaccines for you based on your age, medical history, and lifestyle or other factors, such as travel or where you work.  What tests do I need?  Screening  Your health care provider may recommend screening tests for certain conditions. This may include:  Pelvic exam and Pap test.  Lipid and cholesterol levels.  Diabetes screening. This is done by checking your blood sugar (glucose) after you have not eaten for a while (fasting).  Hepatitis  B test.  Hepatitis C test.  HIV (human immunodeficiency virus) test.  STI (sexually transmitted infection) testing, if you are at risk.  BRCA-related cancer screening. This may be done if you have a family history of breast, ovarian, tubal, or peritoneal cancers.  Talk with your health care provider about your test results, treatment options, and if necessary, the need for more tests.  Follow these instructions at home:  Eating and drinking    Eat a healthy diet that includes fresh fruits and vegetables, whole grains, lean protein, and low-fat dairy products.  Take vitamin and mineral supplements as recommended by your health care provider.  Do not drink alcohol if:  Your health care provider tells you not to drink.  You are pregnant, may be pregnant, or are planning to become pregnant.  If you drink alcohol:  Limit how much you have to 0-1 drink a day.  Know how much alcohol is in your drink. In the [place] one drink equals one 12 oz bottle of beer (355 mL), one 5 oz glass of wine (148 mL), or one 1½ oz glass of hard liquor (44 mL).  Lifestyle  Brush your teeth every morning and night with fluoride toothpaste. Floss one time each day.  Exercise for at least 30 minutes 5 or more days each week.  Do not use any products that contain nicotine or tobacco. These products include cigarettes, chewing tobacco, and vaping devices, such as e-cigarettes. If you need help quitting, ask your health care provider.  Do not use drugs.  If you are sexually active, practice safe sex. Use a condom or other form of protection to prevent STIs.  If you do not wish to become pregnant, use a form of birth control. If you plan to become pregnant, see your health care provider for a prepregnancy visit.  Find healthy ways to manage stress, such as:  Meditation, yoga, or listening to music.  Journaling.  Talking to a trusted person.  Spending time with friends and family.  Minimize exposure to UV radiation to reduce your risk of skin  cancer.  Safety  Always wear your seat belt while driving or riding in a vehicle.  Do not drive:  If you have been drinking alcohol. Do not ride with someone who has been drinking.  If you have been using any mind-altering substances or drugs.  While texting.  When you are tired or distracted.  Wear a helmet and other protective equipment during sports activities.  If you have firearms in your house, make sure you follow all gun safety procedures.  Seek help if you have been physically or sexually abused.  What's next?  Go to your health care provider once a year for an annual wellness visit.  Ask your health care provider how often you should have your eyes and teeth checked.  Stay up to date on all vaccines.  This information is not intended to replace advice given to you by your health care provider. Make sure you discuss any questions you have with your health care provider.  Document Revised: 06/15/2022 Document Reviewed: 06/15/2022  [employer] Patient Education © 2024 [employer]          Clinical References    Exercising to Lose Weight  Getting regular exercise is important for everyone. It is especially important if you are overweight. Being overweight increases your risk of heart disease, stroke, diabetes, high blood pressure, and several types of cancer. Exercising, and reducing the calories you consume, can help you lose weight and improve fitness and health.  Exercise can be moderate or vigorous intensity. To lose weight, most people need to do a certain amount of moderate or vigorous-intensity exercise each week.  How can exercise affect me?  You lose weight when you exercise enough to burn more calories than you eat. Exercise also reduces body fat and builds muscle. The more muscle you have, the more calories you burn. Exercise also:  Improves mood.  Reduces stress and tension.  Improves your overall fitness, flexibility, and endurance.  Increases bone strength.  Moderate-intensity  exercise    Moderate-intensity exercise is any activity that gets you moving enough to burn at least three times more energy (calories) than if you were sitting.  Examples of moderate exercise include:  Walking a mile in 15 minutes.  Doing light yard work.  Biking at an easy pace.  Most people should get at least 150 minutes of moderate-intensity exercise a week to maintain their body weight.  Vigorous-intensity exercise  Vigorous-intensity exercise is any activity that gets you moving enough to burn at least six times more calories than if you were sitting. When you exercise at this intensity, you should be working hard enough that you are not able to carry on a conversation.  Examples of vigorous exercise include:  Running.  Playing a team sport, such as football, basketball, and soccer.  Jumping rope.  Most people should get at least 75 minutes a week of vigorous exercise to maintain their body weight.  What actions can I take to lose weight?  The amount of exercise you need to lose weight depends on:  Your age.  The type of exercise.  Any health conditions you have.  Your overall physical ability.  Talk to your health care provider about how much exercise you need and what types of activities are safe for you.  Nutrition    Make changes to your diet as told by your health care provider or diet and nutrition specialist (dietitian). This may include:  Eating fewer calories.  Eating more protein.  Eating less unhealthy fats.  Eating a diet that includes fresh fruits and vegetables, whole grains, low-fat dairy products, and lean protein.  Avoiding foods with added fat, salt, and sugar.  Drink plenty of water while you exercise to prevent dehydration or heat stroke.  Activity  Choose an activity that you enjoy and set realistic goals. Your health care provider can help you make an exercise plan that works for you.  Exercise at a moderate or vigorous intensity most days of the week.  The intensity of exercise may vary  from person to person. You can tell how intense a workout is for you by paying attention to your breathing and heartbeat. Most people will notice their breathing and heartbeat get faster with more intense exercise.  Do resistance training twice each week, such as:  Push-ups.  Sit-ups.  Lifting weights.  Using resistance bands.  Getting short amounts of exercise can be just as helpful as long, structured periods of exercise. If you have trouble finding time to exercise, try doing these things as part of your daily routine:  Get up, stretch, and walk around every 30 minutes throughout the day.  Go for a walk during your lunch break.  Park your car farther away from your destination.  If you take public transportation, get off one stop early and walk the rest of the way.  Make phone calls while standing up and walking around.  Take the stairs instead of elevators or escalators.  Wear comfortable clothes and shoes with good support.  Do not exercise so much that you hurt yourself, feel dizzy, or get very short of breath.  Where to find more information  [place] Department of Health and Human Services: www.hhs.gov  Centers for Disease Control and Prevention: www.cdc.gov  Contact a health care provider:  Before starting a new exercise program.  If you have questions or concerns about your weight.  If you have a medical problem that keeps you from exercising.  Get help right away if:  You have any of the following while exercising:  Injury.  Dizziness.  Difficulty breathing or shortness of breath that does not go away when you stop exercising.  Chest pain.  Rapid heartbeat.  These symptoms may represent a serious problem that is an emergency. Do not wait to see if the symptoms will go away. Get medical help right away. Call your local emergency services (911 in the [place] Do not drive yourself to the hospital.  Summary  Getting regular exercise is especially important if you are overweight.  Being overweight increases your  risk of heart disease, stroke, diabetes, high blood pressure, and several types of cancer.  Losing weight happens when you burn more calories than you eat.  Reducing the amount of calories you eat, and getting regular moderate or vigorous exercise each week, helps you lose weight.  This information is not intended to replace advice given to you by your health care provider. Make sure you discuss any questions you have with your health care provider.  Document Revised: 02/13/2022 Document Reviewed: 02/13/2022  [employer] Patient Education © 2024 [employer]  Obesity, Adult  Obesity is having too much body fat. Being obese means that your weight is more than what is healthy for you.   BMI (body mass index) is a number that explains how much body fat you have. If you have a BMI of 30 or more, you are obese.  Obesity can cause serious health problems, such as:  Stroke.  Coronary artery disease (CAD).  Type 2 diabetes.  Some types of cancer.  High blood pressure (hypertension).  High cholesterol.  Gallbladder stones.  Obesity can also contribute to:  Osteoarthritis.  Sleep apnea.  Infertility problems.  What are the causes?  Eating meals each day that are high in calories, sugar, and fat.  Drinking a lot of drinks that have sugar in them.  Being born with genes that may make you more likely to become obese.  Having a medical condition that causes obesity.  Taking certain medicines.  Sitting a lot (having a sedentary lifestyle).  Not getting enough sleep.  What increases the risk?  Having a family history of obesity.  Living in an area with limited access to:  Fam, recreation centers, or sidewalks.  Healthy food choices, such as grocery stores and farmers' markets.  What are the signs or symptoms?  The main sign is having too much body fat.  How is this treated?  Treatment for this condition often includes changing your lifestyle. Treatment may include:  Changing your diet. This may include making a healthy meal  plan.  Exercise. This may include activity that causes your heart to beat faster (aerobic exercise) and strength training. Work with your doctor to design a program that works for you.  Medicine to help you lose weight. This may be used if you are not able to lose one pound a week after 6 weeks of healthy eating and more exercise.  Treating conditions that cause the obesity.  Surgery. Options may include gastric banding and gastric bypass. This may be done if:  Other treatments have not helped to improve your condition.  You have a BMI of 40 or higher.  You have life-threatening health problems related to obesity.  Follow these instructions at home:  Eating and drinking    Follow advice from your doctor about what to eat and drink. Your doctor may tell you to:  Limit fast food, sweets, and processed snack foods.  Choose low-fat options. For example, choose low-fat milk instead of whole milk.  Eat five or more servings of fruits or vegetables each day.  Eat at home more often. This gives you more control over what you eat.  Choose healthy foods when you eat out.  Learn to read food labels. This will help you learn how much food is in one serving.  Keep low-fat snacks available.  Avoid drinks that have a lot of sugar in them. These include soda, fruit juice, iced tea with sugar, and flavored milk.  Drink enough water to keep your pee (urine) pale yellow.  Do not go on fad diets.  Physical activity  Exercise often, as told by your doctor. Most adults should get up to 150 minutes of moderate-intensity exercise every week.Ask your doctor:  What types of exercise are safe for you.  How often you should exercise.  Warm up and stretch before being active.  Do slow stretching after being active (cool down).  Rest between times of being active.  Lifestyle  Work with your doctor and a food expert (dietitian) to set a weight-loss goal that is best for you.  Limit your screen time.  Find ways to reward yourself that do not  involve food.  Do not drink alcohol if:  Your doctor tells you not to drink.  You are pregnant, may be pregnant, or are planning to become pregnant.  If you drink alcohol:  Limit how much you have to:  0-1 drink a day for women.  0-2 drinks a day for men.  Know how much alcohol is in your drink. In the [place] one drink equals one 12 oz bottle of beer (355 mL), one 5 oz glass of wine (148 mL), or one 1½ oz glass of hard liquor (44 mL).  General instructions  Keep a weight-loss journal. This can help you keep track of:  The food that you eat.  How much exercise you get.  Take over-the-counter and prescription medicines only as told by your doctor.  Take vitamins and supplements only as told by your doctor.  Think about joining a support group.  Pay attention to your mental health as obesity can lead to depression or self esteem issues.  Keep all follow-up visits.  Contact a doctor if:  You cannot meet your weight-loss goal after you have changed your diet and lifestyle for 6 weeks.  You are having trouble breathing.  Summary  Obesity is having too much body fat.  Being obese means that your weight is more than what is healthy for you.  Work with your doctor to set a weight-loss goal.  Get regular exercise as told by your doctor.  This information is not intended to replace advice given to you by your health care provider. Make sure you discuss any questions you have with your health care provider.  Document Revised: 07/26/2022 Document Reviewed: 07/26/2022  [employer] Patient Education © 2024 [employer]

## 2025-02-10 NOTE — PROGRESS NOTES
Chief Complaint  Annual Exam     Subjective:      History of Present Illness {CC  Problem List  Visit  Diagnosis   Encounters  Notes  Medications  Labs  Result Review Imaging  Media :23}     Maria L Carlisle presents to [facility] for:    Patient or patient representative verbalized consent for the use of Ambient Listening during the visit with  DEEPAK Restrepo for chart documentation. 2/10/2025  09:51 EST     History of Present Illness      Maria L is here for coordination of medical care, to discuss health maintenance, disease prevention as well as to followup on medical problems.     Past Medical History:   Diagnosis Date    Asthma      Family History   Problem Relation Age of Onset    Other (peripartum cardiomyopathy) Mother      Social History     Tobacco Use   Smoking Status Never    Passive exposure: Never   Smokeless Tobacco Never     Social History     Substance and Sexual Activity   Alcohol Use Never       Patient Care Team:  Shari Vela APRN as PCP - General (Family Medicine)     Social:  Marital status: single. She feels safe at home  Employment: full time. TEACHER AT   Patient rates her stress level as: low.  Dental visits: goes once a year - last check up 6 months ago   Dental health: good. Brushes teeth 2 times a day, flosses occasionally.   Vision correction: not needed. Vision check has been a long time  Hearing: normal.  Activity level is minimal.   Exercises 0 per week.     Weight trend is     Health and Weight:   Weight trend is   Wt Readings from Last 4 Encounters:   02/10/25 73.5 kg (162 lb)   12/10/24 72.6 kg (160 lb)   11/27/24 72.1 kg (159 lb)   11/13/24 71 kg (156 lb 9.6 oz)       BMI is >= 30 and <35. (Class 1 Obesity). The following options were offered after discussion;: weight loss educational material (shared in after visit summary), exercise counseling/recommendations, nutrition counseling/recommendations, and information  on healthy weight added to patient's after visit summary       Mental Health Check:   Depression screen: PHQ-2 Total Score:      Health Maintenance Female:  GYN: Dr barahona   Last gynecology appointment: 11/27/24  Patient's last mammogram was Yes, normal   Last Completed Mammogram       This patient has no relevant Health Maintenance data.           Advised routine self-breast exams monthly.  Sexually active: No  Pap smears have been: Normal next due on 11/27/27  Dexa scan (65 for women and 70 for men with fractures)- NA  STI Screen:   [] HIV     [] Syphilis   [] Chlamydia/ Gonorrhea   [x] Declines STD screen    Colon cancer screen:     She has no change in bowel habits.   Patient's last colonoscopy was No .   Last Completed Colonoscopy       This patient has no relevant Health Maintenance data.           She was advised to repeat in Patient declines due to age.    Vaccines:   Vaccines Due:   [] Prevnar 20     [] Flu  [] Shingrix (shingles: series of 2)   [] TDap  [] COVID (booster)     [x] Declines vaccines     Advised regular sunscreen.      Her cardiovascular risks are:   The ASCVD Risk score (Poncho DK, et al., 2019) failed to calculate for the following reasons:    The 2019 ASCVD risk score is only valid for ages 40 to 79    Risk Evaluation:  1. Diabetes risk factors: obesity and sedentary life style.   2. Cancer risk factors: FH of ovarian cancer.  3. Hepatitis  C screen: [] Due  [x] Completed :     Lung Cancer Screen:   [x] Nonsmoker  [] History of smoking  []     [] No Known risk factors    [] Hypertension   [] Hyperlipidemia  [] Diabetes    [x] Obesity  [] Family history   [] Current or hx tobacco use  [x] Sedentary lifestyle   [] Post-menopausal     Skin Cancer:   Regular Sunsceen: Advised  Routine self assessment of your skin, report any changes.   Always where sunglass when outside with UV protection        The patient presents for a routine checkup.    She is due for her COVID-19 and influenza vaccines  this year. She is single and works full-time as a . She reports a low stress level. She has not had a dental check-up in over 6 months and typically visits the dentist annually. She maintains oral hygiene by brushing twice daily. She does not use corrective eyewear and has not had a recent vision check. She reports no headaches or migraines. She plans to start a gym exercise program. She is under the care of Dr. Wheat. She has had a mammogram with normal results. She is not sexually active. Her last Pap smear was normal, and she has never had a colposcopy. She reports no recent fractures or bone breaks. She does not require STI or STD screening today. She reports no changes in bowel habits over the past year and has never had a colonoscopy. She is not aware of any family history of diabetes. She is a non-smoker. She requests a refill of her prenatal vitamins. She experienced iron deficiency during pregnancy but is not currently taking iron supplements. She reports no lumps, bumps, hand swelling, edema, foot swelling, or abdominal pain. She is on Depo-Provera for birth control.    SOCIAL HISTORY  She is single and works full-time as a . She reports a low stress level. She is a non-smoker.    FAMILY HISTORY  Her mother had cervical cancer.    MEDICATIONS  Current: prenatal vitamins, Depo-Provera    IMMUNIZATIONS  She is due for her COVID-19 and influenza vaccines this year.         I have reviewed patient's medical history, any new submitted information provided by patient or medical assistant and updated medical record.      Objective:      Physical Exam  Vitals reviewed.   Constitutional:       General: She is awake. She is not in acute distress.     Appearance: Normal appearance. She is well-groomed and overweight. She is not ill-appearing, toxic-appearing or diaphoretic.      Comments: Gave birth recently 4 months ago    HENT:      Head: Normocephalic.      Right Ear: Hearing normal.       Left Ear: Hearing normal.      Nose: Nose normal.      Mouth/Throat:      Lips: Pink.      Mouth: Mucous membranes are moist.      Pharynx: Oropharynx is clear. Uvula midline.      Tonsils: No tonsillar exudate.   Eyes:      General: Lids are normal. Vision grossly intact.         Right eye: No foreign body, discharge or hordeolum.         Left eye: No foreign body, discharge or hordeolum.      Extraocular Movements: Extraocular movements intact.      Conjunctiva/sclera: Conjunctivae normal.      Pupils: Pupils are equal, round, and reactive to light. Pupils are equal.   Neck:      Thyroid: No thyroid mass, thyromegaly or thyroid tenderness.      Vascular: No carotid bruit or JVD.      Trachea: Trachea and phonation normal.   Cardiovascular:      Rate and Rhythm: Normal rate and regular rhythm.      Pulses: Normal pulses.      Heart sounds: Normal heart sounds, S1 normal and S2 normal. No murmur heard.     No friction rub. No gallop.   Pulmonary:      Effort: Pulmonary effort is normal. No respiratory distress.      Breath sounds: Normal breath sounds. No stridor, decreased air movement or transmitted upper airway sounds. No decreased breath sounds, wheezing, rhonchi or rales.   Chest:      Chest wall: No tenderness.   Abdominal:      General: Bowel sounds are normal. There is no distension or abdominal bruit. There are no signs of injury.      Palpations: Abdomen is soft. There is no mass.      Tenderness: There is no abdominal tenderness. There is no right CVA tenderness, left CVA tenderness, guarding or rebound.      Hernia: No hernia is present.   Musculoskeletal:         General: No swelling, tenderness, deformity or signs of injury. Normal range of motion.      Cervical back: Normal, full passive range of motion without pain and normal range of motion. No rigidity or tenderness.      Thoracic back: Normal.      Lumbar back: Normal.      Right lower leg: No edema.      Left lower leg: No edema.    Lymphadenopathy:      Head:      Right side of head: No submental, submandibular, tonsillar or preauricular adenopathy.      Left side of head: No submental, submandibular, tonsillar or preauricular adenopathy.      Cervical: No cervical adenopathy.      Right cervical: No superficial, deep or posterior cervical adenopathy.     Left cervical: No superficial, deep or posterior cervical adenopathy.   Skin:     General: Skin is warm and dry.      Capillary Refill: Capillary refill takes less than 2 seconds.      Coloration: Skin is not jaundiced or pale.      Findings: No bruising, erythema, lesion or rash.   Neurological:      General: No focal deficit present.      Mental Status: She is alert and oriented to person, place, and time. Mental status is at baseline.      Motor: Motor function is intact. No weakness.      Coordination: Coordination is intact.      Gait: Gait is intact. Gait normal.   Psychiatric:         Attention and Perception: Attention and perception normal.         Mood and Affect: Mood and affect normal.         Speech: Speech normal.         Behavior: Behavior normal. Behavior is cooperative.         Thought Content: Thought content normal.         Cognition and Memory: Cognition and memory normal.         Judgment: Judgment normal.        Result Review  Data Reviewed:{ Labs  Result Review  Imaging  Med Tab  Media :23}     Results         The following data was reviewed by: DEEPAK Restrepo on 02/10/2025  CMP          10/14/2024    00:53 10/14/2024    18:17   CMP   Glucose 70  91    BUN 7     Creatinine 0.60     EGFR 132.0     Sodium 137     Potassium 3.7     Chloride 103     Calcium 9.4     Total Protein 6.6     Albumin 3.7     Globulin 2.9     Total Bilirubin 0.2     Alkaline Phosphatase 129     AST (SGOT) 23     ALT (SGPT) 16     Albumin/Globulin Ratio 1.3     BUN/Creatinine Ratio 11.7     Anion Gap 15.0       CBC          8/23/2024    05:26 10/14/2024    00:53 10/16/2024    06:35  "  CBC   WBC 12.76     12.65  12.20    RBC 3.32     4.18  3.04    Hemoglobin 10.0     12.4  9.6    Hematocrit 29.8     38.0  28.1    MCV 89.8     90.9  92.4    MCH 30.1     29.7  31.6    MCHC 33.6     32.6  34.2    RDW 12.9     13.4  13.1    Platelets 194     233  183       Details          This result is from an external source.             CBC w/diff          8/23/2024    05:26 10/14/2024    00:53 10/16/2024    06:35   CBC w/Diff   WBC 12.76     12.65  12.20    RBC 3.32     4.18  3.04    Hemoglobin 10.0     12.4  9.6    Hematocrit 29.8     38.0  28.1    MCV 89.8     90.9  92.4    MCH 30.1     29.7  31.6    MCHC 33.6     32.6  34.2    RDW 12.9     13.4  13.1    Platelets 194     233  183    Neutrophil Rel % 84.5     67.2  61.8    Immature Granulocyte Rel % 1.2     0.6  0.7    Lymphocyte Rel % 11.4     23.0  27.1    Monocyte Rel % 2.4     5.1  6.4    Eosinophil Rel % 0.3     3.9  3.7    Basophil Rel % 0.2     0.2  0.3       Details          This result is from an external source.                        Vital Signs:   /80 (BP Location: Left arm, Patient Position: Sitting, Cuff Size: Adult)   Pulse 89   Ht 153 cm (60.24\")   Wt 73.5 kg (162 lb)   SpO2 99%   BMI 31.39 kg/m²     BMI is >= 30 and <35. (Class 1 Obesity). The following options were offered after discussion;: weight loss educational material (shared in after visit summary), exercise counseling/recommendations, nutrition counseling/recommendations, and information on healthy weight added to patient's after visit summary              Requested Prescriptions     Signed Prescriptions Disp Refills    Prenatal Vit-Fe Fumarate-FA (WesTab Plus) 27-1 MG tablet 30 tablet 0     Sig: Take 1 tablet by mouth Daily.       Routine medications provided by this office will also be refilled via pharmacy request.       Current Outpatient Medications:     Albuterol-Budesonide 90-80 MCG/ACT aerosol, Inhale 2 puffs., Disp: , Rfl:     budesonide (PULMICORT) 0.5 MG/2ML " nebulizer solution, Inhale 2 mL., Disp: , Rfl:     budesonide-formoterol (SYMBICORT) 160-4.5 MCG/ACT inhaler, Inhale 2 puffs., Disp: , Rfl:     medroxyPROGESTERone (DEPO-PROVERA) 150 MG/ML injection, Inject 1 mL into the appropriate muscle as directed by prescriber Every 3 (Three) Months., Disp: 1 mL, Rfl: 3    predniSONE (DELTASONE) 10 MG tablet, 4 tabs po daily for 4 days then 3 tabs po daily for 4 days then 2 tabs po daily for 4 days then 1 tab po daily for 4 days then stop., Disp: , Rfl:     Prenatal Vit-Fe Fumarate-FA (WesTab Plus) 27-1 MG tablet, Take 1 tablet by mouth Daily., Disp: 30 tablet, Rfl: 0    Spacer/Aero-Holding Chambers (ProChamber VHC) device, Inhale See Admin Instructions. with inhaler, Disp: , Rfl:     Trelegy Ellipta 200-62.5-25 MCG/ACT inhaler, Inhale 1 puff Daily., Disp: , Rfl:      Assessment and Plan:      Assessment and Plan {CC Problem List  Visit Diagnosis  ROS  Review (Popup)  Health Maintenance  Quality  BestPractice  Medications  SmartSets  SnapShot Encounters  Media :23}     Problem List Items Addressed This Visit       Annual physical exam - Primary    Current Assessment & Plan     Current recommendations according to the current Physical Activity Guidelines for Americans: adults need 150-300 minutes of physical exercise weekly. It is also recommended to perform two sessions of full body strength training exercise weekly which includes all major muscle groups including legs, hips, back, abdomen, chest, shoulders, and arms.   Current CDC recommendations for diet include following a diet that emphasizes fruits, vegetables, whole grains that is low in saturated fats and low in sugar intake.   Adults should consume at least 3 cup equivalents of fruit and vegetables daily. It is also beneficial to get 25 grams of fiber daily unless told otherwise by your healthcare provider    Anticipatory guidance given regarding health prevention/wellness, diet/exercise,  tobacco/alcohol/drug education, exercise and wellbeing, covid 19 guidance, vaccination recommendations, and sexual health/STD education.   Recommended bi-yearly dental exams and regular vision examinations.           Iron deficiency anemia (Chronic)    Relevant Orders    Comprehensive Metabolic Panel    CBC & Differential    Iron Profile     Other Visit Diagnoses       Screening for thyroid disorder        Relevant Orders    TSH Rfx On Abnormal To Free T4    Screening for cardiovascular condition        Relevant Orders    Lipid Panel    Microalbumin / Creatinine Urine Ratio - Urine, Clean Catch                 1. Health maintenance.  She is due for her COVID-19, influenza, and HPV vaccines. Her next Pap smear is scheduled for 11/27/2027. She is not due for a colonoscopy at this time. A prescription for prenatal vitamins has been sent to [facility]. Laboratory tests will be conducted today to assess iron levels, thyroid function, cholesterol levels, comprehensive metabolic panel (CMP), and complete blood count (CBC). She has been advised to apply Selsun Blue shampoo to the discolored area of her skin for a duration of 10 minutes before showering.    Follow-up  The patient will follow up in 1 year for her annual physical examination.     Patient Counseling:    Diet:    Eat vegetables, fruits, whole grain, low-fat dairy, poultry, fish, beans, nontropical vegetable oils, and nuts, but avoid red meat (i.e., Mediterranean-style diet, DASH [Dietary Approaches to Stop Hypertension] diet).  Limit sugary drinks and sweets.  Limit saturated and trans fat to 5% to 6% of calories.  Limit sodium intake to 2,400 mg daily (about one teaspoon table salt [kosher/sea salt have less sodium per teaspoon]).    Exercise:   Engage in moderate-to-vigorous aerobic activity for at least 40 minutes (on average) three to four times each week.    Weight loss / Calorie Counting Apps:    Lose It!   MyFitness Pal   Works great when you try  it with a partner/ friend    Wearables:   Activity tracker   Step tracker     Skin Care:  Practice Safe Sun  Use sun screen SPF >30 daily  Protect your eyes with sunglasses   Dermatologist for skin check regularly    Bone Health:   https://www.nof.org/patients/treatment/nutrition/    Substance Abuse:   Discussed cessation/primary prevention of tobacco, alcohol, or other drug use; driving or other dangerous activities under the influence; availability of treatment for abuse.     Sexuality:   Discussed sexually transmitted diseases, partner selection, use of condoms, avoidance of unintended pregnancy, sexuality  and contraceptive alternatives.     Injury prevention:   Discussed safety belts, safety helmets, smoke detector, smoking near bedding or upholstery.     Dental health:   Discussed importance of regular tooth brushing, flossing, and dental visits.    Colon Cancer screen (discussed benefits of screening colonoscopy)  Should usually start at 45 yoa.     Immunizations reviewed  Recommend Shingles vaccine at age 50 (Shingrix which is 2 doses)     Living Will  https://ag.[place].gov/publications/AG%20Publications/livingwillpacket.pdf    Please review added information under the Patient Instructions portion of your print out.    Please complete your lab work today. Following review of results our office will be in contact with you for follow up care, medication changes or further instructions if needed. At that time if we need to schedule a follow up appointment one will be made at the time of the call.    Thank you for allowing us to care for you,  DEEPAK Restrepo      Follow Up {Instructions Charge Capture  Follow-up Communications :23}     Return in about 1 year (around 2/10/2026) for Annual physical.      Patient was given instructions and counseling regarding her condition or for health maintenance advice. Please see specific information pulled into the AVS if appropriate.        Dragon disclaimer:   Much of  this encounter note is an electronic transcription/translation of spoken language to printed text. The electronic translation of spoken language may permit erroneous, or at times, nonsensical words or phrases to be inadvertently transcribed; Although I have reviewed the note for such errors, some may still exist.     Additional Patient Counseling:       Patient Instructions   Preventive Care 21-39 Years Old, Female  Preventive care refers to lifestyle choices and visits with your health care provider that can promote health and wellness. Preventive care visits are also called wellness exams.  What can I expect for my preventive care visit?  Counseling  During your preventive care visit, your health care provider may ask about your:  Medical history, including:  Past medical problems.  Family medical history.  Pregnancy history.  Current health, including:  Menstrual cycle.  Method of birth control.  Emotional well-being.  Home life and relationship well-being.  Sexual activity and sexual health.  Lifestyle, including:  Alcohol, nicotine or tobacco, and drug use.  Access to firearms.  Diet, exercise, and sleep habits.  Work and work environment.  Sunscreen use.  Safety issues such as seatbelt and bike helmet use.  Physical exam  Your health care provider may check your:  Height and weight. These may be used to calculate your BMI (body mass index). BMI is a measurement that tells if you are at a healthy weight.  Waist circumference. This measures the distance around your waistline. This measurement also tells if you are at a healthy weight and may help predict your risk of certain diseases, such as type 2 diabetes and high blood pressure.  Heart rate and blood pressure.  Body temperature.  Skin for abnormal spots.  What immunizations do I need?    Vaccines are usually given at various ages, according to a schedule. Your health care provider will recommend vaccines for you based on your age, medical history, and  lifestyle or other factors, such as travel or where you work.  What tests do I need?  Screening  Your health care provider may recommend screening tests for certain conditions. This may include:  Pelvic exam and Pap test.  Lipid and cholesterol levels.  Diabetes screening. This is done by checking your blood sugar (glucose) after you have not eaten for a while (fasting).  Hepatitis B test.  Hepatitis C test.  HIV (human immunodeficiency virus) test.  STI (sexually transmitted infection) testing, if you are at risk.  BRCA-related cancer screening. This may be done if you have a family history of breast, ovarian, tubal, or peritoneal cancers.  Talk with your health care provider about your test results, treatment options, and if necessary, the need for more tests.  Follow these instructions at home:  Eating and drinking    Eat a healthy diet that includes fresh fruits and vegetables, whole grains, lean protein, and low-fat dairy products.  Take vitamin and mineral supplements as recommended by your health care provider.  Do not drink alcohol if:  Your health care provider tells you not to drink.  You are pregnant, may be pregnant, or are planning to become pregnant.  If you drink alcohol:  Limit how much you have to 0-1 drink a day.  Know how much alcohol is in your drink. In the [place] one drink equals one 12 oz bottle of beer (355 mL), one 5 oz glass of wine (148 mL), or one 1½ oz glass of hard liquor (44 mL).  Lifestyle  Brush your teeth every morning and night with fluoride toothpaste. Floss one time each day.  Exercise for at least 30 minutes 5 or more days each week.  Do not use any products that contain nicotine or tobacco. These products include cigarettes, chewing tobacco, and vaping devices, such as e-cigarettes. If you need help quitting, ask your health care provider.  Do not use drugs.  If you are sexually active, practice safe sex. Use a condom or other form of protection to prevent STIs.  If you do not  wish to become pregnant, use a form of birth control. If you plan to become pregnant, see your health care provider for a prepregnancy visit.  Find healthy ways to manage stress, such as:  Meditation, yoga, or listening to music.  Journaling.  Talking to a trusted person.  Spending time with friends and family.  Minimize exposure to UV radiation to reduce your risk of skin cancer.  Safety  Always wear your seat belt while driving or riding in a vehicle.  Do not drive:  If you have been drinking alcohol. Do not ride with someone who has been drinking.  If you have been using any mind-altering substances or drugs.  While texting.  When you are tired or distracted.  Wear a helmet and other protective equipment during sports activities.  If you have firearms in your house, make sure you follow all gun safety procedures.  Seek help if you have been physically or sexually abused.  What's next?  Go to your health care provider once a year for an annual wellness visit.  Ask your health care provider how often you should have your eyes and teeth checked.  Stay up to date on all vaccines.  This information is not intended to replace advice given to you by your health care provider. Make sure you discuss any questions you have with your health care provider.  Document Revised: 06/15/2022 Document Reviewed: 06/15/2022  [employer] Patient Education © 2024 [employer]          Clinical References    Exercising to Lose Weight  Getting regular exercise is important for everyone. It is especially important if you are overweight. Being overweight increases your risk of heart disease, stroke, diabetes, high blood pressure, and several types of cancer. Exercising, and reducing the calories you consume, can help you lose weight and improve fitness and health.  Exercise can be moderate or vigorous intensity. To lose weight, most people need to do a certain amount of moderate or vigorous-intensity exercise each week.  How can exercise  affect me?  You lose weight when you exercise enough to burn more calories than you eat. Exercise also reduces body fat and builds muscle. The more muscle you have, the more calories you burn. Exercise also:  Improves mood.  Reduces stress and tension.  Improves your overall fitness, flexibility, and endurance.  Increases bone strength.  Moderate-intensity exercise    Moderate-intensity exercise is any activity that gets you moving enough to burn at least three times more energy (calories) than if you were sitting.  Examples of moderate exercise include:  Walking a mile in 15 minutes.  Doing light yard work.  Biking at an easy pace.  Most people should get at least 150 minutes of moderate-intensity exercise a week to maintain their body weight.  Vigorous-intensity exercise  Vigorous-intensity exercise is any activity that gets you moving enough to burn at least six times more calories than if you were sitting. When you exercise at this intensity, you should be working hard enough that you are not able to carry on a conversation.  Examples of vigorous exercise include:  Running.  Playing a team sport, such as football, basketball, and soccer.  Jumping rope.  Most people should get at least 75 minutes a week of vigorous exercise to maintain their body weight.  What actions can I take to lose weight?  The amount of exercise you need to lose weight depends on:  Your age.  The type of exercise.  Any health conditions you have.  Your overall physical ability.  Talk to your health care provider about how much exercise you need and what types of activities are safe for you.  Nutrition    Make changes to your diet as told by your health care provider or diet and nutrition specialist (dietitian). This may include:  Eating fewer calories.  Eating more protein.  Eating less unhealthy fats.  Eating a diet that includes fresh fruits and vegetables, whole grains, low-fat dairy products, and lean protein.  Avoiding foods with added  fat, salt, and sugar.  Drink plenty of water while you exercise to prevent dehydration or heat stroke.  Activity  Choose an activity that you enjoy and set realistic goals. Your health care provider can help you make an exercise plan that works for you.  Exercise at a moderate or vigorous intensity most days of the week.  The intensity of exercise may vary from person to person. You can tell how intense a workout is for you by paying attention to your breathing and heartbeat. Most people will notice their breathing and heartbeat get faster with more intense exercise.  Do resistance training twice each week, such as:  Push-ups.  Sit-ups.  Lifting weights.  Using resistance bands.  Getting short amounts of exercise can be just as helpful as long, structured periods of exercise. If you have trouble finding time to exercise, try doing these things as part of your daily routine:  Get up, stretch, and walk around every 30 minutes throughout the day.  Go for a walk during your lunch break.  Park your car farther away from your destination.  If you take public transportation, get off one stop early and walk the rest of the way.  Make phone calls while standing up and walking around.  Take the stairs instead of elevators or escalators.  Wear comfortable clothes and shoes with good support.  Do not exercise so much that you hurt yourself, feel dizzy, or get very short of breath.  Where to find more information  [place] Department of Health and Human Services: www.hhs.gov  Centers for Disease Control and Prevention: www.cdc.gov  Contact a health care provider:  Before starting a new exercise program.  If you have questions or concerns about your weight.  If you have a medical problem that keeps you from exercising.  Get help right away if:  You have any of the following while exercising:  Injury.  Dizziness.  Difficulty breathing or shortness of breath that does not go away when you stop exercising.  Chest pain.  Rapid  heartbeat.  These symptoms may represent a serious problem that is an emergency. Do not wait to see if the symptoms will go away. Get medical help right away. Call your local emergency services (911 in the [place] Do not drive yourself to the hospital.  Summary  Getting regular exercise is especially important if you are overweight.  Being overweight increases your risk of heart disease, stroke, diabetes, high blood pressure, and several types of cancer.  Losing weight happens when you burn more calories than you eat.  Reducing the amount of calories you eat, and getting regular moderate or vigorous exercise each week, helps you lose weight.  This information is not intended to replace advice given to you by your health care provider. Make sure you discuss any questions you have with your health care provider.  Document Revised: 02/13/2022 Document Reviewed: 02/13/2022  [employer] Patient Education © 2024 [employer]  Obesity, Adult  Obesity is having too much body fat. Being obese means that your weight is more than what is healthy for you.   BMI (body mass index) is a number that explains how much body fat you have. If you have a BMI of 30 or more, you are obese.  Obesity can cause serious health problems, such as:  Stroke.  Coronary artery disease (CAD).  Type 2 diabetes.  Some types of cancer.  High blood pressure (hypertension).  High cholesterol.  Gallbladder stones.  Obesity can also contribute to:  Osteoarthritis.  Sleep apnea.  Infertility problems.  What are the causes?  Eating meals each day that are high in calories, sugar, and fat.  Drinking a lot of drinks that have sugar in them.  Being born with genes that may make you more likely to become obese.  Having a medical condition that causes obesity.  Taking certain medicines.  Sitting a lot (having a sedentary lifestyle).  Not getting enough sleep.  What increases the risk?  Having a family history of obesity.  Living in an area with limited access  to:  Fam, recreation centers, or sidewalks.  Healthy food choices, such as grocery stores and farmers' markets.  What are the signs or symptoms?  The main sign is having too much body fat.  How is this treated?  Treatment for this condition often includes changing your lifestyle. Treatment may include:  Changing your diet. This may include making a healthy meal plan.  Exercise. This may include activity that causes your heart to beat faster (aerobic exercise) and strength training. Work with your doctor to design a program that works for you.  Medicine to help you lose weight. This may be used if you are not able to lose one pound a week after 6 weeks of healthy eating and more exercise.  Treating conditions that cause the obesity.  Surgery. Options may include gastric banding and gastric bypass. This may be done if:  Other treatments have not helped to improve your condition.  You have a BMI of 40 or higher.  You have life-threatening health problems related to obesity.  Follow these instructions at home:  Eating and drinking    Follow advice from your doctor about what to eat and drink. Your doctor may tell you to:  Limit fast food, sweets, and processed snack foods.  Choose low-fat options. For example, choose low-fat milk instead of whole milk.  Eat five or more servings of fruits or vegetables each day.  Eat at home more often. This gives you more control over what you eat.  Choose healthy foods when you eat out.  Learn to read food labels. This will help you learn how much food is in one serving.  Keep low-fat snacks available.  Avoid drinks that have a lot of sugar in them. These include soda, fruit juice, iced tea with sugar, and flavored milk.  Drink enough water to keep your pee (urine) pale yellow.  Do not go on fad diets.  Physical activity  Exercise often, as told by your doctor. Most adults should get up to 150 minutes of moderate-intensity exercise every week.Ask your doctor:  What types of exercise  are safe for you.  How often you should exercise.  Warm up and stretch before being active.  Do slow stretching after being active (cool down).  Rest between times of being active.  Lifestyle  Work with your doctor and a food expert (dietitian) to set a weight-loss goal that is best for you.  Limit your screen time.  Find ways to reward yourself that do not involve food.  Do not drink alcohol if:  Your doctor tells you not to drink.  You are pregnant, may be pregnant, or are planning to become pregnant.  If you drink alcohol:  Limit how much you have to:  0-1 drink a day for women.  0-2 drinks a day for men.  Know how much alcohol is in your drink. In the [place] one drink equals one 12 oz bottle of beer (355 mL), one 5 oz glass of wine (148 mL), or one 1½ oz glass of hard liquor (44 mL).  General instructions  Keep a weight-loss journal. This can help you keep track of:  The food that you eat.  How much exercise you get.  Take over-the-counter and prescription medicines only as told by your doctor.  Take vitamins and supplements only as told by your doctor.  Think about joining a support group.  Pay attention to your mental health as obesity can lead to depression or self esteem issues.  Keep all follow-up visits.  Contact a doctor if:  You cannot meet your weight-loss goal after you have changed your diet and lifestyle for 6 weeks.  You are having trouble breathing.  Summary  Obesity is having too much body fat.  Being obese means that your weight is more than what is healthy for you.  Work with your doctor to set a weight-loss goal.  Get regular exercise as told by your doctor.  This information is not intended to replace advice given to you by your health care provider. Make sure you discuss any questions you have with your health care provider.  Document Revised: 07/26/2022 Document Reviewed: 07/26/2022  [employer] Patient Education © 2024 [employer]

## 2025-02-11 LAB
ALBUMIN SERPL-MCNC: 4.1 G/DL (ref 3.5–5.2)
ALBUMIN/CREAT UR: 13 MG/G CREAT (ref 0–29)
ALBUMIN/GLOB SERPL: 1.4 G/DL
ALP SERPL-CCNC: 143 U/L (ref 39–117)
ALT SERPL-CCNC: 20 U/L (ref 1–33)
AST SERPL-CCNC: 22 U/L (ref 1–32)
BASOPHILS # BLD AUTO: 0.02 10*3/MM3 (ref 0–0.2)
BASOPHILS NFR BLD AUTO: 0.2 % (ref 0–1.5)
BILIRUB SERPL-MCNC: 0.2 MG/DL (ref 0–1.2)
BUN SERPL-MCNC: 5 MG/DL (ref 6–20)
BUN/CREAT SERPL: 6.4 (ref 7–25)
CALCIUM SERPL-MCNC: 9.6 MG/DL (ref 8.6–10.5)
CHLORIDE SERPL-SCNC: 108 MMOL/L (ref 98–107)
CHOLEST SERPL-MCNC: 188 MG/DL (ref 0–200)
CO2 SERPL-SCNC: 26.4 MMOL/L (ref 22–29)
CREAT SERPL-MCNC: 0.78 MG/DL (ref 0.57–1)
CREAT UR-MCNC: 295.7 MG/DL
EGFRCR SERPLBLD CKD-EPI 2021: 111 ML/MIN/1.73
EOSINOPHIL # BLD AUTO: 1.1 10*3/MM3 (ref 0–0.4)
EOSINOPHIL NFR BLD AUTO: 12.6 % (ref 0.3–6.2)
ERYTHROCYTE [DISTWIDTH] IN BLOOD BY AUTOMATED COUNT: 13.5 % (ref 12.3–15.4)
GLOBULIN SER CALC-MCNC: 2.9 GM/DL
GLUCOSE SERPL-MCNC: 69 MG/DL (ref 65–99)
HCT VFR BLD AUTO: 44.7 % (ref 34–46.6)
HDLC SERPL-MCNC: 67 MG/DL (ref 40–60)
HGB BLD-MCNC: 14 G/DL (ref 12–15.9)
IMM GRANULOCYTES # BLD AUTO: 0.02 10*3/MM3 (ref 0–0.05)
IMM GRANULOCYTES NFR BLD AUTO: 0.2 % (ref 0–0.5)
IRON SATN MFR SERPL: 19 % (ref 20–50)
IRON SERPL-MCNC: 91 MCG/DL (ref 37–145)
LDLC SERPL CALC-MCNC: 110 MG/DL (ref 0–100)
LYMPHOCYTES # BLD AUTO: 2.65 10*3/MM3 (ref 0.7–3.1)
LYMPHOCYTES NFR BLD AUTO: 30.4 % (ref 19.6–45.3)
MCH RBC QN AUTO: 27 PG (ref 26.6–33)
MCHC RBC AUTO-ENTMCNC: 31.3 G/DL (ref 31.5–35.7)
MCV RBC AUTO: 86.3 FL (ref 79–97)
MICROALBUMIN UR-MCNC: 38.9 UG/ML
MONOCYTES # BLD AUTO: 0.34 10*3/MM3 (ref 0.1–0.9)
MONOCYTES NFR BLD AUTO: 3.9 % (ref 5–12)
NEUTROPHILS # BLD AUTO: 4.59 10*3/MM3 (ref 1.7–7)
NEUTROPHILS NFR BLD AUTO: 52.7 % (ref 42.7–76)
NRBC BLD AUTO-RTO: 0 /100 WBC (ref 0–0.2)
PLATELET # BLD AUTO: 328 10*3/MM3 (ref 140–450)
POTASSIUM SERPL-SCNC: 3.8 MMOL/L (ref 3.5–5.2)
PROT SERPL-MCNC: 7 G/DL (ref 6–8.5)
RBC # BLD AUTO: 5.18 10*6/MM3 (ref 3.77–5.28)
SODIUM SERPL-SCNC: 144 MMOL/L (ref 136–145)
TIBC SERPL-MCNC: 474 MCG/DL
TRIGL SERPL-MCNC: 60 MG/DL (ref 0–150)
TSH SERPL DL<=0.005 MIU/L-ACNC: 1.09 UIU/ML (ref 0.27–4.2)
UIBC SERPL-MCNC: 383 MCG/DL (ref 112–346)
VLDLC SERPL CALC-MCNC: 11 MG/DL (ref 5–40)
WBC # BLD AUTO: 8.72 10*3/MM3 (ref 3.4–10.8)

## 2025-02-12 ENCOUNTER — TELEPHONE (OUTPATIENT)
Dept: INTERNAL MEDICINE | Facility: CLINIC | Age: 20
End: 2025-02-12
Payer: COMMERCIAL

## 2025-02-12 DIAGNOSIS — R74.8 ELEVATED ALKALINE PHOSPHATASE LEVEL: Primary | ICD-10-CM

## 2025-02-12 DIAGNOSIS — E78.00 ELEVATED LDL CHOLESTEROL LEVEL: ICD-10-CM

## 2025-02-12 DIAGNOSIS — D50.9 IRON DEFICIENCY ANEMIA, UNSPECIFIED IRON DEFICIENCY ANEMIA TYPE: ICD-10-CM

## 2025-02-12 DIAGNOSIS — R79.89 ABNORMAL CBC: ICD-10-CM

## 2025-02-12 NOTE — TELEPHONE ENCOUNTER
----- Message from Shari Vela sent at 2/12/2025  2:16 PM EST -----  Ms Carlisle,   I have reviewed your labs. Your cholesterol numbers are just slightly out of range. Your Bun and creatinine are low this can be from poor protein take which in also refected in your low iron intake. Please start taking over the counter iron supplements daily and they may cause constipation if this happen you can try an over the counter stool softener or MiraLax  as needed. Your chloride is slightly elevated please reduce sodium from your diet.     Please start working on diet changes, decrease fried, greasy fatty foods. Increase your protein intake and red meats if tolerated. Start iron supplementation daily and please schedule a lab appointment for the first week of may to repeat your lab work.   Please let us know if you have any further questions or concerns.  Thank you for allowing us to care for you,  DEEPAK Restrepo

## 2025-02-12 NOTE — TELEPHONE ENCOUNTER
Called pt [id number] and went over labs in full as well as recommended OTC supplement, pt sched a repeat lab for 5/6

## 2025-02-12 NOTE — PROGRESS NOTES
Ms Carlisle,   I have reviewed your labs. Your cholesterol numbers are just slightly out of range. Your Bun and creatinine are low this can be from poor protein take which in also refected in your low iron intake. Please start taking over the counter iron supplements daily and they may cause constipation if this happen you can try an over the counter stool softener or MiraLax  as needed. Your chloride is slightly elevated please reduce sodium from your diet.     Please start working on diet changes, decrease fried, greasy fatty foods. Increase your protein intake and red meats if tolerated. Start iron supplementation daily and please schedule a lab appointment for the first week of may to repeat your lab work.   Please let us know if you have any further questions or concerns.  Thank you for allowing us to care for you,  DEEPAK Restrepo

## 2025-05-09 ENCOUNTER — RESULTS FOLLOW-UP (OUTPATIENT)
Dept: INTERNAL MEDICINE | Facility: CLINIC | Age: 20
End: 2025-05-09
Payer: COMMERCIAL

## 2025-05-09 NOTE — PROGRESS NOTES
Ms Carlisle,   I have reviewed your lab work. Your CBC has stablized. Your kidney and liver numbers are better. Your Alkaline phosphatase can be elevated from inflammation, weight gain, or other generalized fatty liver, we will continue to monitor these number with routine check ups. Your cbc shows no active infection at this time. Your iron levels are within normal range please continue with the prenatal vitamins.   Please let us know if you have any further questions or concerns.  Thank you for allowing us to care for you,  DEEPAK Restrepo

## 2025-08-19 ENCOUNTER — OFFICE VISIT (OUTPATIENT)
Dept: INTERNAL MEDICINE | Facility: CLINIC | Age: 20
End: 2025-08-19
Payer: COMMERCIAL

## 2025-08-19 VITALS
HEART RATE: 90 BPM | WEIGHT: 181 LBS | DIASTOLIC BLOOD PRESSURE: 76 MMHG | TEMPERATURE: 97.1 F | BODY MASS INDEX: 35.53 KG/M2 | SYSTOLIC BLOOD PRESSURE: 106 MMHG | OXYGEN SATURATION: 98 % | HEIGHT: 60 IN

## 2025-08-19 DIAGNOSIS — M25.561 ACUTE PAIN OF RIGHT KNEE: Primary | ICD-10-CM

## 2025-08-19 PROCEDURE — 99213 OFFICE O/P EST LOW 20 MIN: CPT

## 2025-08-19 RX ORDER — PREDNISONE 10 MG/1
TABLET ORAL
Qty: 15 TABLET | Refills: 0 | Status: SHIPPED | OUTPATIENT
Start: 2025-08-19